# Patient Record
Sex: FEMALE | Race: BLACK OR AFRICAN AMERICAN | NOT HISPANIC OR LATINO | Employment: FULL TIME | URBAN - METROPOLITAN AREA
[De-identification: names, ages, dates, MRNs, and addresses within clinical notes are randomized per-mention and may not be internally consistent; named-entity substitution may affect disease eponyms.]

---

## 2017-04-17 ENCOUNTER — OFFICE VISIT (OUTPATIENT)
Dept: INTERNAL MEDICINE | Facility: CLINIC | Age: 32
End: 2017-04-17
Payer: COMMERCIAL

## 2017-04-17 ENCOUNTER — LAB VISIT (OUTPATIENT)
Dept: LAB | Facility: HOSPITAL | Age: 32
End: 2017-04-17
Attending: NURSE PRACTITIONER
Payer: COMMERCIAL

## 2017-04-17 VITALS
DIASTOLIC BLOOD PRESSURE: 66 MMHG | HEIGHT: 65 IN | BODY MASS INDEX: 39.63 KG/M2 | TEMPERATURE: 97 F | HEART RATE: 91 BPM | SYSTOLIC BLOOD PRESSURE: 106 MMHG | WEIGHT: 237.88 LBS

## 2017-04-17 DIAGNOSIS — Z11.3 SCREENING FOR STD (SEXUALLY TRANSMITTED DISEASE): ICD-10-CM

## 2017-04-17 DIAGNOSIS — Z00.00 ANNUAL PHYSICAL EXAM: Primary | ICD-10-CM

## 2017-04-17 DIAGNOSIS — F17.200 TOBACCO USE DISORDER: ICD-10-CM

## 2017-04-17 DIAGNOSIS — E66.9 OBESITY (BMI 35.0-39.9 WITHOUT COMORBIDITY): ICD-10-CM

## 2017-04-17 DIAGNOSIS — B97.7 HPV IN FEMALE: ICD-10-CM

## 2017-04-17 DIAGNOSIS — Z00.00 ANNUAL PHYSICAL EXAM: ICD-10-CM

## 2017-04-17 DIAGNOSIS — L50.9 URTICARIA: ICD-10-CM

## 2017-04-17 LAB
ALBUMIN SERPL BCP-MCNC: 3.2 G/DL
ALP SERPL-CCNC: 71 U/L
ALT SERPL W/O P-5'-P-CCNC: 21 U/L
ANION GAP SERPL CALC-SCNC: 9 MMOL/L
AST SERPL-CCNC: 18 U/L
BASOPHILS # BLD AUTO: 0.01 K/UL
BASOPHILS NFR BLD: 0.2 %
BILIRUB SERPL-MCNC: 0.3 MG/DL
BUN SERPL-MCNC: 11 MG/DL
CALCIUM SERPL-MCNC: 9.2 MG/DL
CHLORIDE SERPL-SCNC: 104 MMOL/L
CHOLEST/HDLC SERPL: 4 {RATIO}
CO2 SERPL-SCNC: 26 MMOL/L
CREAT SERPL-MCNC: 0.9 MG/DL
DIFFERENTIAL METHOD: ABNORMAL
EOSINOPHIL # BLD AUTO: 0.1 K/UL
EOSINOPHIL NFR BLD: 1 %
ERYTHROCYTE [DISTWIDTH] IN BLOOD BY AUTOMATED COUNT: 15.4 %
EST. GFR  (AFRICAN AMERICAN): >60 ML/MIN/1.73 M^2
EST. GFR  (NON AFRICAN AMERICAN): >60 ML/MIN/1.73 M^2
GLUCOSE SERPL-MCNC: 86 MG/DL
HCT VFR BLD AUTO: 36.8 %
HDL/CHOLESTEROL RATIO: 25.3 %
HDLC SERPL-MCNC: 198 MG/DL
HDLC SERPL-MCNC: 50 MG/DL
HGB BLD-MCNC: 12.2 G/DL
LDLC SERPL CALC-MCNC: 128.6 MG/DL
LYMPHOCYTES # BLD AUTO: 2.1 K/UL
LYMPHOCYTES NFR BLD: 36.5 %
MCH RBC QN AUTO: 28.6 PG
MCHC RBC AUTO-ENTMCNC: 33.2 %
MCV RBC AUTO: 86 FL
MONOCYTES # BLD AUTO: 0.2 K/UL
MONOCYTES NFR BLD: 3.1 %
NEUTROPHILS # BLD AUTO: 3.4 K/UL
NEUTROPHILS NFR BLD: 59 %
NONHDLC SERPL-MCNC: 148 MG/DL
PLATELET # BLD AUTO: 250 K/UL
PMV BLD AUTO: 11 FL
POTASSIUM SERPL-SCNC: 4 MMOL/L
PROT SERPL-MCNC: 7.4 G/DL
RBC # BLD AUTO: 4.27 M/UL
SODIUM SERPL-SCNC: 139 MMOL/L
TRIGL SERPL-MCNC: 97 MG/DL
WBC # BLD AUTO: 5.73 K/UL

## 2017-04-17 PROCEDURE — 85025 COMPLETE CBC W/AUTO DIFF WBC: CPT

## 2017-04-17 PROCEDURE — 99395 PREV VISIT EST AGE 18-39: CPT | Mod: S$GLB,,, | Performed by: NURSE PRACTITIONER

## 2017-04-17 PROCEDURE — 90715 TDAP VACCINE 7 YRS/> IM: CPT | Mod: S$GLB,,, | Performed by: NURSE PRACTITIONER

## 2017-04-17 PROCEDURE — 86592 SYPHILIS TEST NON-TREP QUAL: CPT

## 2017-04-17 PROCEDURE — 90471 IMMUNIZATION ADMIN: CPT | Mod: 59,S$GLB,, | Performed by: NURSE PRACTITIONER

## 2017-04-17 PROCEDURE — 86703 HIV-1/HIV-2 1 RESULT ANTBDY: CPT

## 2017-04-17 PROCEDURE — 80053 COMPREHEN METABOLIC PANEL: CPT

## 2017-04-17 PROCEDURE — 83036 HEMOGLOBIN GLYCOSYLATED A1C: CPT

## 2017-04-17 PROCEDURE — 99999 PR PBB SHADOW E&M-EST. PATIENT-LVL III: CPT | Mod: PBBFAC,,, | Performed by: NURSE PRACTITIONER

## 2017-04-17 PROCEDURE — 36415 COLL VENOUS BLD VENIPUNCTURE: CPT | Mod: PO

## 2017-04-17 PROCEDURE — 80074 ACUTE HEPATITIS PANEL: CPT

## 2017-04-17 PROCEDURE — 96372 THER/PROPH/DIAG INJ SC/IM: CPT | Mod: S$GLB,,, | Performed by: NURSE PRACTITIONER

## 2017-04-17 PROCEDURE — 80061 LIPID PANEL: CPT

## 2017-04-17 RX ORDER — BETAMETHASONE SODIUM PHOSPHATE AND BETAMETHASONE ACETATE 3; 3 MG/ML; MG/ML
6 INJECTION, SUSPENSION INTRA-ARTICULAR; INTRALESIONAL; INTRAMUSCULAR; SOFT TISSUE
Status: COMPLETED | OUTPATIENT
Start: 2017-04-17 | End: 2017-04-17

## 2017-04-17 RX ORDER — LORATADINE 10 MG/1
10 TABLET ORAL DAILY
Qty: 30 TABLET | Refills: 3 | Status: SHIPPED | OUTPATIENT
Start: 2017-04-17 | End: 2023-04-21

## 2017-04-17 RX ADMIN — BETAMETHASONE SODIUM PHOSPHATE AND BETAMETHASONE ACETATE 6 MG: 3; 3 INJECTION, SUSPENSION INTRA-ARTICULAR; INTRALESIONAL; INTRAMUSCULAR; SOFT TISSUE at 09:04

## 2017-04-17 NOTE — MR AVS SNAPSHOT
[facility]  [address] [facility] [place] [address]  Phone: [phone]  Fax: [phone]                  Allison Matamoros   2017 8:40 AM   Office Visit    Description:  Female : 1985   Provider:  Arlyn Park NP   Department:  [facility]           Reason for Visit     Annual Exam     Urticaria           Diagnoses this Visit        Comments    Annual physical exam    -  Primary     Tobacco use disorder         HPV in female         Localized hives     believed to be a stress related     Screening for STD (sexually transmitted disease)         Obesity (BMI 35.0-39.9 without comorbidity)         Urticaria                To Do List           Future Appointments        Provider Department Dept Phone    2017 9:30 AM LAB, SAME DAY [facility] [phone]    2017 10:10 AM SPECIMEN, [facility] [phone]      Goals (5 Years of Data)     None       These Medications        Disp Refills Start End    loratadine (CLARITIN) 10 mg tablet 30 tablet 3 2017    Take 1 tablet (10 mg total) by mouth once daily. - Oral    Pharmacy: [facility] [address] [place], [place] [address], [place] - [address] [place] [address] #: [phone]         [facility]     [facility] Nurse Care Line - 24 Assistance  Unless otherwise directed by your provider, please contact [facility], our nurse care line that is available for / assistance.     Registered nurses in the [facility] provide: appointment scheduling, clinical advisement, health education, and other advisory services.  Call: [phone] (toll free)               Medications           Message regarding Medications     Verify the changes and/or additions to your medication regime listed below are the same as discussed with your clinician today.  If any of these changes or additions are incorrect, please notify your healthcare provider.       "  START taking these NEW medications        Refills    loratadine (CLARITIN) 10 mg tablet 3    Sig: Take 1 tablet (10 mg total) by mouth once daily.    Class: Normal    Route: Oral      These medications were administered today        Dose Freq    betamethasone acetate-betamethasone sodium phosphate injection 6 mg 6 mg Clinic/HOD 1 time    Sig: Inject 1 mL (6 mg total) into the muscle one time.    Class: Normal    Route: Intramuscular           Verify that the below list of medications is an accurate representation of the medications you are currently taking.  If none reported, the list may be blank. If incorrect, please contact your healthcare provider. Carry this list with you in case of emergency.           Current Medications     loratadine (CLARITIN) 10 mg tablet Take 1 tablet (10 mg total) by mouth once daily.           Clinical Reference Information           Your Vitals Were     BP Pulse Temp Height Weight BMI    106/66 91 96.7 °F (35.9 °C) (Tympanic) 5' 5" (1.651 m) 107.9 kg (237 lb 14 oz) 39.58 kg/m2      Blood Pressure          Most Recent Value    BP  106/66      Allergies as of 4/17/2017     No Known Allergies      Immunizations Administered on Date of Encounter - 4/17/2017     Name Date Dose VIS Date Route    TDAP  Incomplete 0.5 mL 2/24/2015 Intramuscular      Orders Placed During Today's Visit      Normal Orders This Visit    Ambulatory consult to Obstetrics / Gynecology     Tdap Vaccine     Future Labs/Procedures Expected by Expires    CBC auto differential  4/17/2017 7/16/2017    Comprehensive metabolic panel  4/17/2017 7/16/2017    Hemoglobin A1c  4/17/2017 7/16/2017    Hepatitis panel, acute  4/17/2017 6/16/2018    HIV-1 and HIV-2 antibodies  4/17/2017 6/16/2018    Lipid panel  4/17/2017 7/16/2017    RPR  4/17/2017 6/16/2018    Urinalysis  4/17/2017 7/16/2017      Smoking Cessation     If you would like to quit smoking:   You may be eligible for free services if you are a [place] resident and " started smoking cigarettes before September 1, 1988.  Call the Smoking Cessation Trust (SCT) toll free at (960) 974-3724 or (005) 124-0256.   Call 1-800-QUIT-NOW if you do not meet the above criteria.   Contact us via email: [email or web address]   View our website for more information: [email or web address]        Language Assistance Services     ATTENTION: Language assistance services are available, free of charge. Please call [phone].      ATENCIÓN: Si habla español, tiene a chandler disposición servicios gratuitos de asistencia lingüística. Llame al [phone].     CHÚ Ý: N?u b?n nói Ti?ng Vi?t, có các d?ch v? h? tr? ngôn ng? mi?n phí dành cho b?n. G?i s? [phone].         [facility] complies with applicable Federal civil rights laws and does not discriminate on the basis of race, color, national origin, age, disability, or sex.

## 2017-04-17 NOTE — PROGRESS NOTES
Subjective:       Patient ID: Allison Matamoros is a 31 y.o. female.    Chief Complaint: Annual Exam and Urticaria    HPI Comments: Pt is here for annual exam    She has a hx of of obesity, tobacco use (1 pack every 1-2 weeks x 7 years), and HPV    Immunizations- will like to have tetanus vaccine today         Up to date with women's health- last pap normal- will have next one in 2018- she is interested in seeing OBGYN for BC discussion and to have annual completed.  No symptoms of STDs today. She is sexually active. She uses condoms.    Complaints - stress hives- she reports that at work ([employer]) x 4 years- stress is 10/10 while at work. States that she is always pressed for time and barely has time to eat. She took benadryl and she is still  itching and scratching although the hives have improved. She also took a oatmeal bath  She denies sob, cp, headaches, blurred vision, stomach upset, urinary complaints  She does not feel like she needs counseling or medication for stress. She thinks she has adequate coping mechanisms and a good support system at home.    Obesity- not consistent with exercising- is trying to do better. She has been attempting the atkins program- trying to avoid fast food. She has regular BMs.    Tobacco- has not tried to quit.       DM- runs in family would like to be checked.   No excessive hunger, but reports dry mouth and increased urination at night. No other urinary symptoms    Does not wear contacts or glasses  Does not have vision insurance    Dental- every 6 months for cleaning    Urticaria       Review of Systems    Objective:      Physical Exam    Assessment:       1. Annual physical exam    2. Tobacco use disorder    3. HPV in female    4. Screening for STD (sexually transmitted disease)    5. Obesity (BMI 35.0-39.9 without comorbidity)    6. Urticaria        Plan:   Annual physical exam  -     Lipid panel; Future; Expected date: 4/17/17  -     Tdap Vaccine  -     Ambulatory consult to  Obstetrics / Gynecology  -     Hemoglobin A1c; Future; Expected date: 4/17/17  -     Comprehensive metabolic panel; Future; Expected date: 4/17/17  -     CBC auto differential; Future; Expected date: 4/17/17    Tobacco use disorder  Comments:  pt is not interested in quitting at this time.     HPV in female  -     Ambulatory consult to Obstetrics / Gynecology    Screening for STD (sexually transmitted disease)  -     HIV-1 and HIV-2 antibodies; Future; Expected date: 4/17/17  -     Hepatitis panel, acute; Future; Expected date: 4/17/17  -     RPR; Future; Expected date: 4/17/17  -     Urinalysis; Future; Expected date: 4/17/17    Obesity (BMI 35.0-39.9 without comorbidity)  Comments:  pt attempting to start exercise routine and using Atkins diet.     Urticaria  Comments:  believed to be a stress related. start claritin daily. steroid IM today  Orders:  -     betamethasone acetate-betamethasone sodium phosphate injection 6 mg; Inject 1 mL (6 mg total) into the muscle one time.    Other orders  -     loratadine (CLARITIN) 10 mg tablet; Take 1 tablet (10 mg total) by mouth once daily.  Dispense: 30 tablet; Refill: 3      Avoid hot showers- could worsen urticaria. Continue oatmeal baths. Start claritin daily. Benadryl at night PRN  Tetanus today  Labs today   Std screening  Ob gyn referral for discussion of BC options and annual exam   RTC annually and PRN

## 2017-04-18 LAB
ESTIMATED AVG GLUCOSE: 103 MG/DL
HAV IGM SERPL QL IA: NEGATIVE
HBA1C MFR BLD HPLC: 5.2 %
HBV CORE IGM SERPL QL IA: NEGATIVE
HBV SURFACE AG SERPL QL IA: NEGATIVE
HCV AB SERPL QL IA: NEGATIVE
HIV 1+2 AB+HIV1 P24 AG SERPL QL IA: NEGATIVE
RPR SER QL: NORMAL

## 2021-03-04 ENCOUNTER — HISTORICAL (OUTPATIENT)
Dept: ADMINISTRATIVE | Facility: HOSPITAL | Age: 36
End: 2021-03-04

## 2021-03-04 LAB
ABS NEUT (OLG): 1.51 X10(3)/MCL (ref 2.1–9.2)
ALBUMIN SERPL-MCNC: 3.9 GM/DL (ref 3.5–5)
ALBUMIN/GLOB SERPL: 1.1 RATIO (ref 1.1–2)
ALP SERPL-CCNC: 55 UNIT/L (ref 40–150)
ALT SERPL-CCNC: 19 UNIT/L (ref 0–55)
APPEARANCE, UA: CLEAR
AST SERPL-CCNC: 18 UNIT/L (ref 5–34)
BACTERIA SPEC CULT: NORMAL /HPF
BASOPHILS # BLD AUTO: 0 X10(3)/MCL (ref 0–0.2)
BASOPHILS NFR BLD AUTO: 1 %
BILIRUB SERPL-MCNC: 0.3 MG/DL
BILIRUB UR QL STRIP: NEGATIVE
BILIRUBIN DIRECT+TOT PNL SERPL-MCNC: 0.1 MG/DL (ref 0–0.5)
BILIRUBIN DIRECT+TOT PNL SERPL-MCNC: 0.2 MG/DL (ref 0–0.8)
BUN SERPL-MCNC: 13.9 MG/DL (ref 7–18.7)
CALCIUM SERPL-MCNC: 9.2 MG/DL (ref 8.4–10.2)
CHLORIDE SERPL-SCNC: 104 MMOL/L (ref 98–107)
CHOLEST SERPL-MCNC: 217 MG/DL
CHOLEST/HDLC SERPL: 3 {RATIO} (ref 0–5)
CO2 SERPL-SCNC: 30 MMOL/L (ref 22–29)
COLOR UR: YELLOW
CREAT SERPL-MCNC: 0.8 MG/DL (ref 0.55–1.02)
EOSINOPHIL # BLD AUTO: 0.2 X10(3)/MCL (ref 0–0.9)
EOSINOPHIL NFR BLD AUTO: 4 %
ERYTHROCYTE [DISTWIDTH] IN BLOOD BY AUTOMATED COUNT: 13.7 % (ref 11.5–17)
EST. AVERAGE GLUCOSE BLD GHB EST-MCNC: 88.2 MG/DL
GLOBULIN SER-MCNC: 3.4 GM/DL (ref 2.4–3.5)
GLUCOSE (UA): NEGATIVE
GLUCOSE SERPL-MCNC: 86 MG/DL (ref 74–100)
HAV IGM SERPL QL IA: NONREACTIVE
HBA1C MFR BLD: 4.7 %
HBV CORE IGM SERPL QL IA: NONREACTIVE
HBV SURFACE AG SERPL QL IA: NONREACTIVE
HCT VFR BLD AUTO: 39.7 % (ref 37–47)
HCV AB SERPL QL IA: NONREACTIVE
HDLC SERPL-MCNC: 76 MG/DL (ref 35–60)
HEPATITIS PANEL INTERP: NORMAL
HGB BLD-MCNC: 12.6 GM/DL (ref 12–16)
HGB UR QL STRIP: NEGATIVE
HIV 1+2 AB+HIV1 P24 AG SERPL QL IA: NONREACTIVE
KETONES UR QL STRIP: NEGATIVE
LDLC SERPL CALC-MCNC: 130 MG/DL (ref 50–140)
LEUKOCYTE ESTERASE UR QL STRIP: NEGATIVE
LYMPHOCYTES # BLD AUTO: 2.3 X10(3)/MCL (ref 0.6–4.6)
LYMPHOCYTES NFR BLD AUTO: 53 %
MCH RBC QN AUTO: 32.1 PG (ref 27–31)
MCHC RBC AUTO-ENTMCNC: 31.7 GM/DL (ref 33–36)
MCV RBC AUTO: 101 FL (ref 80–94)
MONOCYTES # BLD AUTO: 0.3 X10(3)/MCL (ref 0.1–1.3)
MONOCYTES NFR BLD AUTO: 7 %
NEUTROPHILS # BLD AUTO: 1.51 X10(3)/MCL (ref 2.1–9.2)
NEUTROPHILS NFR BLD AUTO: 35 %
NITRITE UR QL STRIP: NEGATIVE
PH UR STRIP: 6 [PH] (ref 5–9)
PLATELET # BLD AUTO: 211 X10(3)/MCL (ref 130–400)
PMV BLD AUTO: 11.5 FL (ref 9.4–12.4)
POTASSIUM SERPL-SCNC: 4.2 MMOL/L (ref 3.5–5.1)
PROT SERPL-MCNC: 7.3 GM/DL (ref 6.4–8.3)
PROT UR QL STRIP: NEGATIVE
RBC # BLD AUTO: 3.93 X10(6)/MCL (ref 4.2–5.4)
RBC #/AREA URNS HPF: NORMAL /[HPF]
SODIUM SERPL-SCNC: 139 MMOL/L (ref 136–145)
SP GR UR STRIP: 1.02 (ref 1–1.03)
SQUAMOUS EPITHELIAL, UA: NORMAL
T PALLIDUM AB SER QL: NONREACTIVE
TRIGL SERPL-MCNC: 54 MG/DL (ref 37–140)
TSH SERPL-ACNC: 0.65 UIU/ML (ref 0.35–4.94)
UROBILINOGEN UR STRIP-ACNC: 0.2
VLDLC SERPL CALC-MCNC: 11 MG/DL
WBC # SPEC AUTO: 4.3 X10(3)/MCL (ref 4.5–11.5)
WBC #/AREA URNS HPF: NORMAL /[HPF]

## 2021-03-10 ENCOUNTER — HISTORICAL (OUTPATIENT)
Dept: ADMINISTRATIVE | Facility: HOSPITAL | Age: 36
End: 2021-03-10

## 2021-03-10 LAB
FOLATE SERPL-MCNC: 11.5 NG/ML (ref 7–31.4)
VIT B12 SERPL-MCNC: 671 PG/ML (ref 213–816)

## 2021-11-10 LAB
PAP RECOMMENDATION EXT: NORMAL
PAP SMEAR: NORMAL

## 2022-04-11 ENCOUNTER — HISTORICAL (OUTPATIENT)
Dept: ADMINISTRATIVE | Facility: HOSPITAL | Age: 37
End: 2022-04-11

## 2022-04-29 VITALS
BODY MASS INDEX: 30.34 KG/M2 | SYSTOLIC BLOOD PRESSURE: 112 MMHG | DIASTOLIC BLOOD PRESSURE: 70 MMHG | WEIGHT: 182.13 LBS | OXYGEN SATURATION: 98 % | HEIGHT: 65 IN

## 2022-12-10 ENCOUNTER — DOCUMENTATION ONLY (OUTPATIENT)
Dept: INTERNAL MEDICINE | Facility: CLINIC | Age: 37
End: 2022-12-10
Payer: COMMERCIAL

## 2023-04-18 DIAGNOSIS — Z00.00 ENCOUNTER FOR WELLNESS EXAMINATION IN ADULT: Primary | ICD-10-CM

## 2023-04-20 ENCOUNTER — LAB VISIT (OUTPATIENT)
Dept: LAB | Facility: HOSPITAL | Age: 38
End: 2023-04-20
Attending: FAMILY MEDICINE
Payer: COMMERCIAL

## 2023-04-20 DIAGNOSIS — Z00.00 ENCOUNTER FOR WELLNESS EXAMINATION IN ADULT: ICD-10-CM

## 2023-04-20 LAB
ALBUMIN SERPL-MCNC: 3.7 G/DL (ref 3.5–5)
ALBUMIN/GLOB SERPL: 1 RATIO (ref 1.1–2)
ALP SERPL-CCNC: 60 UNIT/L (ref 40–150)
ALT SERPL-CCNC: 14 UNIT/L (ref 0–55)
AST SERPL-CCNC: 18 UNIT/L (ref 5–34)
BASOPHILS # BLD AUTO: 0.03 X10(3)/MCL (ref 0–0.2)
BASOPHILS NFR BLD AUTO: 0.7 %
BILIRUBIN DIRECT+TOT PNL SERPL-MCNC: 0.7 MG/DL
BUN SERPL-MCNC: 12.5 MG/DL (ref 7–18.7)
CALCIUM SERPL-MCNC: 9.7 MG/DL (ref 8.4–10.2)
CHLORIDE SERPL-SCNC: 104 MMOL/L (ref 98–107)
CHOLEST SERPL-MCNC: 232 MG/DL
CHOLEST/HDLC SERPL: 3 {RATIO} (ref 0–5)
CO2 SERPL-SCNC: 23 MMOL/L (ref 22–29)
CREAT SERPL-MCNC: 0.87 MG/DL (ref 0.55–1.02)
DEPRECATED CALCIDIOL+CALCIFEROL SERPL-MC: 30.7 NG/ML (ref 30–80)
EOSINOPHIL # BLD AUTO: 0.14 X10(3)/MCL (ref 0–0.9)
EOSINOPHIL NFR BLD AUTO: 3.1 %
ERYTHROCYTE [DISTWIDTH] IN BLOOD BY AUTOMATED COUNT: 14.6 % (ref 11.5–17)
EST. AVERAGE GLUCOSE BLD GHB EST-MCNC: 88.2 MG/DL
GFR SERPLBLD CREATININE-BSD FMLA CKD-EPI: >60 MLS/MIN/1.73/M2
GLOBULIN SER-MCNC: 3.7 GM/DL (ref 2.4–3.5)
GLUCOSE SERPL-MCNC: 95 MG/DL (ref 74–100)
HBA1C MFR BLD: 4.7 %
HCT VFR BLD AUTO: 40.2 % (ref 37–47)
HDLC SERPL-MCNC: 74 MG/DL (ref 35–60)
HGB BLD-MCNC: 12.9 G/DL (ref 12–16)
IMM GRANULOCYTES # BLD AUTO: 0.01 X10(3)/MCL (ref 0–0.04)
IMM GRANULOCYTES NFR BLD AUTO: 0.2 %
LDLC SERPL CALC-MCNC: 148 MG/DL (ref 50–140)
LYMPHOCYTES # BLD AUTO: 1.57 X10(3)/MCL (ref 0.6–4.6)
LYMPHOCYTES NFR BLD AUTO: 34.9 %
MCH RBC QN AUTO: 29.9 PG (ref 27–31)
MCHC RBC AUTO-ENTMCNC: 32.1 G/DL (ref 33–36)
MCV RBC AUTO: 93.3 FL (ref 80–94)
MONOCYTES # BLD AUTO: 0.45 X10(3)/MCL (ref 0.1–1.3)
MONOCYTES NFR BLD AUTO: 10 %
NEUTROPHILS # BLD AUTO: 2.3 X10(3)/MCL (ref 2.1–9.2)
NEUTROPHILS NFR BLD AUTO: 51.1 %
NRBC BLD AUTO-RTO: 0 %
PLATELET # BLD AUTO: 235 X10(3)/MCL (ref 130–400)
PMV BLD AUTO: 10.2 FL (ref 7.4–10.4)
POTASSIUM SERPL-SCNC: 4.2 MMOL/L (ref 3.5–5.1)
PROT SERPL-MCNC: 7.4 GM/DL (ref 6.4–8.3)
RBC # BLD AUTO: 4.31 X10(6)/MCL (ref 4.2–5.4)
SODIUM SERPL-SCNC: 140 MMOL/L (ref 136–145)
TRIGL SERPL-MCNC: 51 MG/DL (ref 37–140)
TSH SERPL-ACNC: 1.46 UIU/ML (ref 0.35–4.94)
VLDLC SERPL CALC-MCNC: 10 MG/DL
WBC # SPEC AUTO: 4.5 X10(3)/MCL (ref 4.5–11.5)

## 2023-04-20 PROCEDURE — 83036 HEMOGLOBIN GLYCOSYLATED A1C: CPT

## 2023-04-20 PROCEDURE — 80061 LIPID PANEL: CPT

## 2023-04-20 PROCEDURE — 80053 COMPREHEN METABOLIC PANEL: CPT

## 2023-04-20 PROCEDURE — 84443 ASSAY THYROID STIM HORMONE: CPT

## 2023-04-20 PROCEDURE — 85025 COMPLETE CBC W/AUTO DIFF WBC: CPT

## 2023-04-20 PROCEDURE — 36415 COLL VENOUS BLD VENIPUNCTURE: CPT

## 2023-04-20 PROCEDURE — 82306 VITAMIN D 25 HYDROXY: CPT

## 2023-04-21 ENCOUNTER — PATIENT MESSAGE (OUTPATIENT)
Dept: FAMILY MEDICINE | Facility: CLINIC | Age: 38
End: 2023-04-21

## 2023-04-21 ENCOUNTER — HOSPITAL ENCOUNTER (OUTPATIENT)
Dept: RADIOLOGY | Facility: HOSPITAL | Age: 38
Discharge: HOME | End: 2023-04-21
Attending: FAMILY MEDICINE
Payer: COMMERCIAL

## 2023-04-21 ENCOUNTER — OFFICE VISIT (OUTPATIENT)
Dept: FAMILY MEDICINE | Facility: CLINIC | Age: 38
End: 2023-04-21
Payer: COMMERCIAL

## 2023-04-21 VITALS
WEIGHT: 233.13 LBS | OXYGEN SATURATION: 99 % | HEIGHT: 65 IN | RESPIRATION RATE: 18 BRPM | SYSTOLIC BLOOD PRESSURE: 112 MMHG | DIASTOLIC BLOOD PRESSURE: 72 MMHG | HEART RATE: 72 BPM | BODY MASS INDEX: 38.84 KG/M2

## 2023-04-21 DIAGNOSIS — G89.29 CHRONIC PAIN OF LEFT KNEE: ICD-10-CM

## 2023-04-21 DIAGNOSIS — R53.83 FATIGUE, UNSPECIFIED TYPE: ICD-10-CM

## 2023-04-21 DIAGNOSIS — L65.9 HAIR LOSS: ICD-10-CM

## 2023-04-21 DIAGNOSIS — M25.562 CHRONIC PAIN OF LEFT KNEE: ICD-10-CM

## 2023-04-21 DIAGNOSIS — Z00.00 WELLNESS EXAMINATION: Primary | ICD-10-CM

## 2023-04-21 DIAGNOSIS — E66.09 CLASS 2 OBESITY DUE TO EXCESS CALORIES WITHOUT SERIOUS COMORBIDITY WITH BODY MASS INDEX (BMI) OF 38.0 TO 38.9 IN ADULT: ICD-10-CM

## 2023-04-21 DIAGNOSIS — M25.562 CHRONIC PAIN OF LEFT KNEE: Primary | ICD-10-CM

## 2023-04-21 DIAGNOSIS — G89.29 CHRONIC PAIN OF LEFT KNEE: Primary | ICD-10-CM

## 2023-04-21 LAB
APPEARANCE UR: CLEAR
BACTERIA #/AREA URNS AUTO: NORMAL /HPF
BILIRUB UR QL STRIP.AUTO: NEGATIVE MG/DL
COLOR UR AUTO: YELLOW
GLUCOSE UR QL STRIP.AUTO: NEGATIVE MG/DL
KETONES UR QL STRIP.AUTO: NEGATIVE MG/DL
LEUKOCYTE ESTERASE UR QL STRIP.AUTO: NEGATIVE UNIT/L
NITRITE UR QL STRIP.AUTO: NEGATIVE
PH UR STRIP.AUTO: 6.5 [PH]
PROT UR QL STRIP.AUTO: NEGATIVE MG/DL
RBC #/AREA URNS AUTO: <5 /HPF
RBC UR QL AUTO: NEGATIVE UNIT/L
SP GR UR STRIP.AUTO: 1.01 (ref 1–1.03)
SQUAMOUS #/AREA URNS AUTO: <5 /HPF
UROBILINOGEN UR STRIP-ACNC: 0.2 MG/DL
WBC #/AREA URNS AUTO: <5 /HPF

## 2023-04-21 PROCEDURE — 3008F BODY MASS INDEX DOCD: CPT | Mod: CPTII,,, | Performed by: FAMILY MEDICINE

## 2023-04-21 PROCEDURE — 99214 PR OFFICE/OUTPT VISIT, EST, LEVL IV, 30-39 MIN: ICD-10-PCS | Mod: 25,,, | Performed by: FAMILY MEDICINE

## 2023-04-21 PROCEDURE — 3008F PR BODY MASS INDEX (BMI) DOCUMENTED: ICD-10-PCS | Mod: CPTII,,, | Performed by: FAMILY MEDICINE

## 2023-04-21 PROCEDURE — 1160F RVW MEDS BY RX/DR IN RCRD: CPT | Mod: CPTII,,, | Performed by: FAMILY MEDICINE

## 2023-04-21 PROCEDURE — 1159F MED LIST DOCD IN RCRD: CPT | Mod: CPTII,,, | Performed by: FAMILY MEDICINE

## 2023-04-21 PROCEDURE — 1159F PR MEDICATION LIST DOCUMENTED IN MEDICAL RECORD: ICD-10-PCS | Mod: CPTII,,, | Performed by: FAMILY MEDICINE

## 2023-04-21 PROCEDURE — 3078F DIAST BP <80 MM HG: CPT | Mod: CPTII,,, | Performed by: FAMILY MEDICINE

## 2023-04-21 PROCEDURE — 73560 X-RAY EXAM OF KNEE 1 OR 2: CPT | Mod: TC,LT

## 2023-04-21 PROCEDURE — 3074F SYST BP LT 130 MM HG: CPT | Mod: CPTII,,, | Performed by: FAMILY MEDICINE

## 2023-04-21 PROCEDURE — 3074F PR MOST RECENT SYSTOLIC BLOOD PRESSURE < 130 MM HG: ICD-10-PCS | Mod: CPTII,,, | Performed by: FAMILY MEDICINE

## 2023-04-21 PROCEDURE — 99214 OFFICE O/P EST MOD 30 MIN: CPT | Mod: 25,,, | Performed by: FAMILY MEDICINE

## 2023-04-21 PROCEDURE — 3078F PR MOST RECENT DIASTOLIC BLOOD PRESSURE < 80 MM HG: ICD-10-PCS | Mod: CPTII,,, | Performed by: FAMILY MEDICINE

## 2023-04-21 PROCEDURE — 1160F PR REVIEW ALL MEDS BY PRESCRIBER/CLIN PHARMACIST DOCUMENTED: ICD-10-PCS | Mod: CPTII,,, | Performed by: FAMILY MEDICINE

## 2023-04-21 PROCEDURE — 99395 PR PREVENTIVE VISIT,EST,18-39: ICD-10-PCS | Mod: ,,, | Performed by: FAMILY MEDICINE

## 2023-04-21 PROCEDURE — 99395 PREV VISIT EST AGE 18-39: CPT | Mod: ,,, | Performed by: FAMILY MEDICINE

## 2023-04-21 RX ORDER — DICLOFENAC SODIUM 10 MG/G
2 GEL TOPICAL 4 TIMES DAILY PRN
Qty: 100 G | Refills: 1 | Status: SHIPPED | OUTPATIENT
Start: 2023-04-21 | End: 2024-04-02 | Stop reason: SDUPTHER

## 2023-04-21 RX ORDER — MINOXIDIL 2 %
SOLUTION, NON-ORAL TOPICAL 2 TIMES DAILY
Qty: 60 ML | Refills: 1 | Status: SHIPPED | OUTPATIENT
Start: 2023-04-21

## 2023-04-21 NOTE — PROGRESS NOTES
Patient ID: [id number]     Chief Complaint: Annual Exam        HPI:     Allison Lopez is a 37 y.o. female here today for annual wellness exam.  Well Adult History   The patient presents for well adult exam. The patient's general health status is described as good. The patient's diet is described as balanced. Exercise: occasional. Associated symptoms consist of reports 60 pound intentional weight loss with lifestyle modifications, denies weight gain, denies fatigue, denies headache, denies snoring, denies witnessed apnea, denies hearing loss and denies vision changes. Last menstrual period: 04/02/2023, regular. Additional pertinent history: last dental exam: > 6 months ago, she has dental insurance, she will schedule an appt. next available, last eye exam: >2 years ago (wears reading eyeglasses, she has vision insurance, she will schedule an appt. next available), last pap smear : 11/10/2021 (WNL with negative HPV with GYN- Dr. Lanier), seat belt use, daily caffeine use (coffee), tobacco use none- quit smoking cigarettes in 2021, social alcohol use and She had labs done on 04/20/2023, here to discuss the results today. She would not like STD screening. She refuses flu vaccine and COVID-19 booster, she is UTD on all other vaccines. She is obese, gained 37 pounds since last visit due to non-compliance with diet, she is not interested in surgical weight loss, she would like to see a dietician, she would like trial of Wegovy to help with weight loss. She denies family history of MEN II or medullary thyroid cancer or personal history of pancreatitis, she is trying to get pregnant.  - She complains of fatigue, hair loss, occasional left knee pain/swelling x several months. She remote history of a fall on knee, pain is 1-2/10 on pain scale, she reports that it's worse with walking, she hasn't had imaging or PT, she denies popping of knee. She has tried OTC Tylenol without resolution of symptoms. She reports that she used  to relax her hair, but stopped due to her hair loss. She denies family history of auto-immune illness.   - Patient is without any other complaints today.    Advance Care Planning     Date: 2023  Patient did not wish or was not able to name a surrogate decision maker or provide an Advance Care Plan.          ----------------------------  Abnormal Pap smear      Comment:  Colposcopy and cold cone  History of gonorrhea  Tobacco use disorder     Past Surgical History:   Procedure Laterality Date    APPENDECTOMY       SECTION, LOW TRANSVERSE      x 1       Review of patient's allergies indicates:  No Known Allergies    No outpatient medications have been marked as taking for the 23 encounter (Office Visit) with Yaquelin Bailey MD.       Social History     Socioeconomic History    Marital status: Single    Number of children: 1   Occupational History    Occupation: IT     Employer: [employer]     Comment: [employer]   Tobacco Use    Smoking status: Former     Packs/day: 0.20     Years: 1.00     Pack years: 0.20     Types: Cigarettes    Smokeless tobacco: Never   Substance and Sexual Activity    Alcohol use: Yes     Alcohol/week: 2.5 standard drinks     Types: 3 drink(s) per week     Comment: Occasionally    Drug use: No    Sexual activity: Yes     Partners: Male     Birth control/protection: Condom     Comment: 1 partner     Social Determinants of Health     Financial Resource Strain: Low Risk     Difficulty of Paying Living Expenses: Not very hard   Food Insecurity: No Food Insecurity    Worried About Running Out of Food in the Last Year: Never true    Ran Out of Food in the Last Year: Never true   Transportation Needs: No Transportation Needs    Lack of Transportation (Medical): No    Lack of Transportation (Non-Medical): No   Physical Activity: Insufficiently Active    Days of Exercise per Week: 3 days    Minutes of Exercise per Session: 40 min   Stress: No Stress Concern Present    Feeling of Stress : Not  at all   Social Connections: Socially Isolated    Frequency of Communication with Friends and Family: Once a week    Frequency of Social Gatherings with Friends and Family: Once a week    Attends Buddhism Services: Never    Active Member of Clubs or Organizations: No    Attends Club or Organization Meetings: Never    Marital Status: Never    Housing Stability: Low Risk     Unable to Pay for Housing in the Last Year: No    Number of Places Lived in the Last Year: 1    Unstable Housing in the Last Year: No        Family History   Problem Relation Age of Onset    No Known Problems Mother     Diabetes Sister     No Known Problems Daughter     Hypertension Maternal Grandmother     Diabetes Maternal Grandfather     Stroke Neg Hx     Cancer Neg Hx     Heart disease Neg Hx         Subjective:       Review of Systems:    See HPI for details    Constitutional: No fever, No chills, Reports fatigue.   Eye: No blurring, No visual disturbances.   Ear/Nose/Mouth/Throat: No decreased hearing, No ear pain, No nasal congestion, No sore throat.   Respiratory: No shortness of breath, No cough, No wheezing.   Cardiovascular: No chest pain, No palpitations, No peripheral edema.   Breast: Both breasts, No lump/ mass, No pain.   Nipple discharge: None.   Gastrointestinal: No nausea, No vomiting, No diarrhea, No constipation, No abdominal pain.   Genitourinary: No dysuria, No hematuria.   Gynecologic: Negative except as documented in history of present illness.   Hematology/Lymphatics: No bruising tendency, No bleeding tendency, No swollen lymph glands.   Endocrine: No excessive thirst, No polyuria, No excessive hunger.   Musculoskeletal: Reports joint pain, No muscle pain, No decreased range of motion.   Integumentary: As per HPI. No rash, No pruritus.   Neurologic: No abnormal balance, No confusion, No headache.   Psychiatric: No anxiety, No depression, Not suicidal, No hallucinations.     All Other ROS: Negative except as stated  "in HPI.   Answers submitted by the patient for this visit:  Review of Systems Questionnaire (Submitted on 4/18/2023)  activity change: Yes  unexpected weight change: Yes  neck pain: No  hearing loss: No  rhinorrhea: No  trouble swallowing: No  eye discharge: No  visual disturbance: No  chest tightness: No  wheezing: No  chest pain: No  palpitations: Yes  blood in stool: No  constipation: No  vomiting: No  diarrhea: No  polydipsia: No  polyuria: Yes  difficulty urinating: No  hematuria: No  menstrual problem: No  dysuria: No  joint swelling: Yes  arthralgias: Yes  headaches: Yes  weakness: Yes  confusion: No  dysphoric mood: No      Objective:     /72 (BP Location: Left arm, Patient Position: Sitting, BP Method: Large (Automatic))   Pulse 72   Resp 18   Ht 5' 5" (1.651 m)   Wt 105.7 kg (233 lb 1.6 oz)   LMP 04/02/2023   SpO2 99%   BMI 38.79 kg/m²     Physical Exam    General: Alert and oriented, No acute distress, Obese.   Eye: Pupils are equal, round and reactive to light, Extraocular movements are intact, Normal conjunctiva.   HENT: Normocephalic, Tympanic membranes are clear, Normal hearing, Oral mucosa is moist, No pharyngeal erythema.   Throat: Pharynx ( Not edematous, No exudate ).   Neck: Supple, Non-tender, No carotid bruit, No lymphadenopathy, No thyromegaly.   Respiratory: Lungs are clear to auscultation, Respirations are non-labored, Breath sounds are equal, Symmetrical chest wall expansion, No chest wall tenderness.   Cardiovascular: Normal rate, Regular rhythm, No murmur, Good pulses equal in all extremities, No edema.   Gastrointestinal: Soft, Non-tender, Non-distended, Normal bowel sounds, No organomegaly.   Genitourinary: No costovertebral angle tenderness.   Musculoskeletal: Normal range of motion, No tenderness, Normal gait. Left knee with FROM, NT, positive crepitus, no erythema, no warmth, negative Lachman's, negative Amy's.  Integumentary: Mid scalp with thinning hair, still has " hair growth from follicles, no erythema, no rashes.  Neurologic: No focal deficits, Cranial Nerves II-XII are grossly intact.   Psychiatric: Cooperative, Appropriate mood & affect, Normal judgment, Non-suicidal.   Mood and affect: Calm.   Behavior: Relaxed.     *Lab results from 04/20/2023 were reviewed and discussed with patient and patient voices understanding. *        Assessment:       ICD-10-CM ICD-9-CM   1. Wellness examination  Z00.00 V70.0   2. Class 2 obesity due to excess calories without serious comorbidity with body mass index (BMI) of 38.0 to 38.9 in adult  E66.09 278.00    Z68.38 V85.38   3. Fatigue, unspecified type  R53.83 780.79   4. Hair loss  L65.9 704.00   5. Chronic pain of left knee  M25.562 719.46    G89.29 338.29        Plan:     Problem List Items Addressed This Visit          Endocrine    Obesity    Relevant Medications    semaglutide, weight loss, 0.25 mg/0.5 mL PnIj    Other Relevant Orders    Ambulatory referral/consult to Nutrition Services     Other Visit Diagnoses       Wellness examination    -  Primary    Relevant Orders    Urinalysis, Reflex to Urine Culture    Fatigue, unspecified type        Relevant Orders    C-Reactive Protein    ANTINUCLEAR ANTIBODIES COMPREHENSIVE PANEL    CYCLIC CITRULLINATED PEPTIDE (CCP) ANTIBODY    Rheumatoid Factors, IgA, IgG, IgM    Uric Acid    Vitamin B12    CK    Hair loss        Relevant Medications    minoxidiL (ROGAINE) 2 % external solution    Other Relevant Orders    ANTINUCLEAR ANTIBODIES COMPREHENSIVE PANEL    CYCLIC CITRULLINATED PEPTIDE (CCP) ANTIBODY    Rheumatoid Factors, IgA, IgG, IgM    Uric Acid    Vitamin B12    CK    Chronic pain of left knee        Relevant Medications    diclofenac sodium (VOLTAREN) 1 % Gel    Other Relevant Orders    C-Reactive Protein    ANTINUCLEAR ANTIBODIES COMPREHENSIVE PANEL    CYCLIC CITRULLINATED PEPTIDE (CCP) ANTIBODY    Rheumatoid Factors, IgA, IgG, IgM    Uric Acid    CK    X-Ray Knee 1 or 2 View Left          1. Wellness examination  - Urinalysis, Reflex to Urine Culture; Future  - Will treat pending lab results. Monthly breast self exam encouraged. Diet, exercise, and 10% weight loss encouraged. Keep appointment for dental exams x q6 months as scheduled. Keep appointment for annual eye exam as scheduled. Keep appointment with GYN for annual pap smear as scheduled. Notify M.D. or ER if temp greater than 100.4, or any acute illness.      2. Class 2 obesity due to excess calories without serious comorbidity with body mass index (BMI) of 38.0 to 38.9 in adult  - Ambulatory referral/consult to Nutrition Services; Future for dietary counseling  - semaglutide, weight loss, 0.25 mg/0.5 mL PnIj; Inject 0.25 mg into the skin every 7 days.  Dispense: 2 mL; Refill: 1  - Diet. exercise, and 10% weight loss encouraged. Rx trial of Wegovy with close monitoring to help with obesity. She agrees to avoid pregnancy with Wegovy. Patient agrees to download Wegovy coupon card from the internet. Will titrate Rx Wegovy as needed/tolerated until max dose achieved. Notify M.D. or ER if symptoms persist or worsen, adverse Rx side effects, temp greater than 100.4, or any acute illness.      3. Fatigue, unspecified type  - C-Reactive Protein; Future  - ANTINUCLEAR ANTIBODIES COMPREHENSIVE PANEL; Future  - CYCLIC CITRULLINATED PEPTIDE (CCP) ANTIBODY; Future  - Rheumatoid Factors, IgA, IgG, IgM; Future  - Uric Acid; Future  - Vitamin B12; Future  - CK; Future  - Daily MVI encouraged. Will treat pending results. Notify M.D. or ER if symptoms persist or worsen, temp >100.4, or any acute illness.      4. Hair loss  - Rx trial of minoxidiL (ROGAINE) 2 % external solution; Apply topically 2 (two) times daily.  Dispense: 60 mL; Refill: 1  - ANTINUCLEAR ANTIBODIES COMPREHENSIVE PANEL; Future  - CYCLIC CITRULLINATED PEPTIDE (CCP) ANTIBODY; Future  - Rheumatoid Factors, IgA, IgG, IgM; Future  - Uric Acid; Future  - Vitamin B12; Future  - CK; Future  -  Rx trial of OTC Biotin as directed. Will treat pending results. Avoid chemicals in hair. If no improvement, will proceed with Dermatology referral. Notify M.D. or ER if symptoms persist or worsen, temp >100.4, or any acute illness.      5. Chronic pain of left knee  - C-Reactive Protein; Future  - ANTINUCLEAR ANTIBODIES COMPREHENSIVE PANEL; Future  - CYCLIC CITRULLINATED PEPTIDE (CCP) ANTIBODY; Future  - Rheumatoid Factors, IgA, IgG, IgM; Future  - Uric Acid; Future  - Rx trial of diclofenac sodium (VOLTAREN) 1 % Gel; Apply 2 g topically 4 (four) times daily as needed (pain/inflammation).  Dispense: 100 g; Refill: 1  - CK; Future  - X-Ray Knee 1 or 2 View Left; Future  - Stretching exercises encouraged. Will treat pending results. If XR is normal, will proceed with PT referral. Notify M.D. or ER if symptoms persist or worsen, temp >100.4, or any acute illness.          Allison was seen today for annual exam.    Diagnoses and all orders for this visit:    Wellness examination  -     Urinalysis, Reflex to Urine Culture; Future    Class 2 obesity due to excess calories without serious comorbidity with body mass index (BMI) of 38.0 to 38.9 in adult  -     Ambulatory referral/consult to Nutrition Services; Future  -     semaglutide, weight loss, 0.25 mg/0.5 mL PnIj; Inject 0.25 mg into the skin every 7 days.    Fatigue, unspecified type  -     C-Reactive Protein; Future  -     ANTINUCLEAR ANTIBODIES COMPREHENSIVE PANEL; Future  -     CYCLIC CITRULLINATED PEPTIDE (CCP) ANTIBODY; Future  -     Rheumatoid Factors, IgA, IgG, IgM; Future  -     Uric Acid; Future  -     Vitamin B12; Future  -     CK; Future    Hair loss  -     minoxidiL (ROGAINE) 2 % external solution; Apply topically 2 (two) times daily.  -     ANTINUCLEAR ANTIBODIES COMPREHENSIVE PANEL; Future  -     CYCLIC CITRULLINATED PEPTIDE (CCP) ANTIBODY; Future  -     Rheumatoid Factors, IgA, IgG, IgM; Future  -     Uric Acid; Future  -     Vitamin B12; Future  -      CK; Future    Chronic pain of left knee  -     C-Reactive Protein; Future  -     ANTINUCLEAR ANTIBODIES COMPREHENSIVE PANEL; Future  -     CYCLIC CITRULLINATED PEPTIDE (CCP) ANTIBODY; Future  -     Rheumatoid Factors, IgA, IgG, IgM; Future  -     Uric Acid; Future  -     diclofenac sodium (VOLTAREN) 1 % Gel; Apply 2 g topically 4 (four) times daily as needed (pain/inflammation).  -     CK; Future  -     X-Ray Knee 1 or 2 View Left; Future    Other orders  -     Cancel: X-Ray Knee 1 or 2 View Right; Future          Medication List with Changes/Refills   New Medications    DICLOFENAC SODIUM (VOLTAREN) 1 % GEL    Apply 2 g topically 4 (four) times daily as needed (pain/inflammation).       Start Date: 4/21/2023 End Date: --    MINOXIDIL (ROGAINE) 2 % EXTERNAL SOLUTION    Apply topically 2 (two) times daily.       Start Date: 4/21/2023 End Date: --    SEMAGLUTIDE, WEIGHT LOSS, 0.25 MG/0.5 ML PNIJ    Inject 0.25 mg into the skin every 7 days.       Start Date: 4/21/2023 End Date: 6/20/2023   Current Medications    LORATADINE (CLARITIN) 10 MG TABLET    Take 1 tablet (10 mg total) by mouth once daily.       Start Date: 4/17/2017 End Date: 4/17/2018          Follow up in about 4 weeks (around 5/19/2023) for Obesity Followup, Virtual Visit.

## 2023-04-24 ENCOUNTER — PATIENT MESSAGE (OUTPATIENT)
Dept: FAMILY MEDICINE | Facility: CLINIC | Age: 38
End: 2023-04-24
Payer: COMMERCIAL

## 2023-04-25 ENCOUNTER — PATIENT MESSAGE (OUTPATIENT)
Dept: FAMILY MEDICINE | Facility: CLINIC | Age: 38
End: 2023-04-25
Payer: COMMERCIAL

## 2023-04-25 DIAGNOSIS — R76.8 RHEUMATOID FACTOR POSITIVE: Primary | ICD-10-CM

## 2023-04-26 ENCOUNTER — TELEPHONE (OUTPATIENT)
Dept: FAMILY MEDICINE | Facility: CLINIC | Age: 38
End: 2023-04-26
Payer: COMMERCIAL

## 2023-05-18 ENCOUNTER — NUTRITION (OUTPATIENT)
Dept: NUTRITION | Facility: HOSPITAL | Age: 38
End: 2023-05-18
Attending: FAMILY MEDICINE
Payer: COMMERCIAL

## 2023-05-18 DIAGNOSIS — E66.09 CLASS 2 OBESITY DUE TO EXCESS CALORIES WITHOUT SERIOUS COMORBIDITY WITH BODY MASS INDEX (BMI) OF 38.0 TO 38.9 IN ADULT: ICD-10-CM

## 2023-05-18 PROCEDURE — 97802 MEDICAL NUTRITION INDIV IN: CPT

## 2023-05-18 NOTE — PROGRESS NOTES
Nutrition Note: 2023   Referring Provider: Yaquelin Bailey, *  Reason for visit: Obesity/Weight Management   Consultation Time: 60 Minutes     A = NUTRITION ASSESSMENT   Patient Information:    Allison Lopez  : 1985   37 y.o. female    Allergies/Intolerances:  none reports intolerance to avocados    Anthropometrics:     Wt:   106.6 kg                               Ht   165.1 cm       BMI: 39  IBW: 56.7 kg (188% IBW)           Supplements/Vitamins:    MVI/Supp: Reviewed  Drug/Nutrient interactions: Reviewed and yes  Activity Level:     Sedentary      Form of Activity: None at this time      Food/Nutrition-related hx:    Diet/PO Recall:   Appetite: Good  Fluid Intake: Inadequate  Diet Recall:  Breakfast: grits, eggs, mccarty or omelet or oatmeal  Lunch: rice and gravy, spaghetti, leftovers, fast food - raising cane occasionally  Dinner: Similar to lunch   Snacks: 0-1x/day popcorn, chips, or candy after dinner   Drinks: Sodas x 2 16 oz, very little water, zero calorie fruit juice, alcohol (1 - 2 drinks x day)     Eating out: 0-1x/week.     Patient Notes/Reports: Pt referred for wt management by Dr. Bailey. Pt with hx T2DM in family. Pt very pleasant. Past diet hx includes keto diet. Pt tells me lost a lot of wt on this, but not sustainable, and gained all wt back once quit. Is looking for a more balanced approach to dieting. Would also like to set good example for her dtr. Per diet recall, pt eating high fat/high Na food items, and drinking sugary and/or alcoholic beverages. Tells me tends to over-eat in evening. Used to exercise but has stopped. 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    Medical Tests and Procedures:  Patient Active Problem List   Diagnosis    Tobacco use disorder    Obesity    HPV in female      Past Medical History:   Diagnosis Date    Abnormal Pap smear 2011    Colposcopy and cold cone    History of gonorrhea     Tobacco use disorder      Past Surgical  History:   Procedure Laterality Date    APPENDECTOMY       SECTION, LOW TRANSVERSE      x 1       Current Outpatient Medications   Medication Instructions    diclofenac sodium (VOLTAREN) 2 g, Topical (Top), 4 times daily PRN    minoxidiL (ROGAINE) 2 % external solution Topical (Top), 2 times daily    semaglutide (weight loss) 0.25 mg, Subcutaneous, Every 7 days      Labs:    Lab Results   Component Value Date    WBC 4.5 2023    HGB 12.9 2023    HCT 40.2 2023    CHOL 232 (H) 2023    TRIG 51 2023    HDL 74 (H) 2023    LDLCALC 128.6 2017    HGBA1C 4.7 2023     2023    K 4.2 2023    CALCIUM 9.7 2023         D = NUTRITION DIAGNOSIS    PES Statement:   Primary Problem: Overweight/Obesity  related to physical inactivity/intake in excess of needs as evidenced by BMI of 39, diet recall, pt reports.      Secondary Problem: Undesirable Food Choices  related to lack of prior exposure to nutrition education by RD as evidenced by diet recall.      I = NUTRITION INTERVENTION   Discussed healthy plate method on healthy eating, incorporating more non-starchy vegetables, whole grains, and eliminating high calorie/sugary beverages. Discussed sugary foods and/or beverages (potential health consequences of excessive sugar intake, ways to reduce sugar intake, healthy beverage alternatives, etc.). Discussed saturated fat and trans fat intake and examples of high fat foods to avoid (potential health consequences and recommendation to choose more heart healthy fats) Discussed nutrient-dense meals and snacks versus empty-calories.  Discussed importance of physical activity - pt agreeable to walk/strength train at least 30 min/day x 5 days/wk. Discussed taking a walk break every hour when working at desk. Discussed importance of small behavior/habit changes in improving long-term adherence and sustainability (pt agreeable to avoid sodas, limit alcohol, exercise,  "and incorporate more vegetables into meals. Pt was able to make mock meal plan with little RDN assistance incorporating healthy choices at meals, and improving from diet recall.     Answered parents/patient's questions appropriately.      Patient  active and engaged during session and verbalized desire to make changes. Contact information provided, understanding verbalized and compliance expected.   Estimated Energy/Fluid Requirements:   Weight used: .6 kg  Calories: 1200 - 1500 kcal/day (11-14 kcal/kg )  Protein: 113 g/day (2.0 g/kg/IBW)  Fluid: 2340 mL/day or 78 oz/day (22 mL/kg) or per MD.    Education Materials Provided:   Nutrition Plan, Healthy Plate Method, and [employer] grocery Guide, [employer] restaurant Guide, 5 day Meal Plan    Recommendations:     Ensure balanced eating pattern of 3 meals, 1-2 snacks daily. Avoid skipped meals.    Focus on protein at all meals and snacks. Examples provided.    Gradually increase exercise to 150 min/wk or 30 min x 5 days/wk.  - Plans to walk/strength train  Keep portions appropriate by measuring and weighing all foods or using body (fist, palm, etc)   Incorporate cross-training exercises for example strength (own body weight, pushups, resistance training/weight bearing), aerobic/cardiac (walking/running, jumping jacks, etc), and flexibility (stretching, yoga.    Ensure adequate H2O intake of 8 - 10 cups/day   Make grocery list ahead of time to plan healthy meals   Fill 9 in plate with 1/2 non-starchy vegetables, 1/4 protein, 1/4 CHO using My[employer] Handout     Be mindful of hunger and satiety cures using hunger-satiety scale as guideline. Pause in the middle of eating and ask yourself how the food tastes, and what your current hunger level is.    Keep diary to record daily goals. Check off in evening as completed  Avoid sugary beverages/alcohol/ or other "empty calories" - examples provided        M/E = NUTRITION MONITORING AND EVALUATION   Goal 1: Adhere to " "dietary/lifestyle guidelines to facilitate wt loss of 10% (~24 lbs) in next 3 - 4 months  Indicator: Weight/BMI    Goal 2: Diet recall shows decrease/improvements in high calorie foods/drinks and consuming balanced eating pattern including lean proteins, whole grains, and fruit/vegetables by next RD visit.   Indicator: Diet Recall     F/U:  Family/Patient provided with Dietitian contact number and advised to call or make future appointment if further intervention is needed.    Communication with provider via Epic    Signature: Jaye Yo (Brooke) RDN, LDN      "

## 2023-07-31 ENCOUNTER — PATIENT MESSAGE (OUTPATIENT)
Dept: RESEARCH | Facility: HOSPITAL | Age: 38
End: 2023-07-31
Payer: COMMERCIAL

## 2023-09-12 ENCOUNTER — OFFICE VISIT (OUTPATIENT)
Dept: RHEUMATOLOGY | Facility: CLINIC | Age: 38
End: 2023-09-12
Payer: COMMERCIAL

## 2023-09-12 VITALS
WEIGHT: 241.19 LBS | SYSTOLIC BLOOD PRESSURE: 111 MMHG | BODY MASS INDEX: 40.18 KG/M2 | HEART RATE: 84 BPM | DIASTOLIC BLOOD PRESSURE: 64 MMHG | HEIGHT: 65 IN | OXYGEN SATURATION: 99 % | TEMPERATURE: 99 F

## 2023-09-12 DIAGNOSIS — M05.9 SEROPOSITIVE RHEUMATOID ARTHRITIS: Primary | ICD-10-CM

## 2023-09-12 DIAGNOSIS — K21.9 GASTROESOPHAGEAL REFLUX DISEASE WITHOUT ESOPHAGITIS: ICD-10-CM

## 2023-09-12 DIAGNOSIS — G47.00 INSOMNIA, UNSPECIFIED TYPE: ICD-10-CM

## 2023-09-12 DIAGNOSIS — M79.7 FIBROMYALGIA SYNDROME: ICD-10-CM

## 2023-09-12 DIAGNOSIS — R76.8 RHEUMATOID FACTOR POSITIVE: ICD-10-CM

## 2023-09-12 PROCEDURE — 1160F RVW MEDS BY RX/DR IN RCRD: CPT | Mod: CPTII,S$GLB,, | Performed by: INTERNAL MEDICINE

## 2023-09-12 PROCEDURE — 3074F PR MOST RECENT SYSTOLIC BLOOD PRESSURE < 130 MM HG: ICD-10-PCS | Mod: CPTII,S$GLB,, | Performed by: INTERNAL MEDICINE

## 2023-09-12 PROCEDURE — 99999 PR PBB SHADOW E&M-EST. PATIENT-LVL IV: CPT | Mod: PBBFAC,,, | Performed by: INTERNAL MEDICINE

## 2023-09-12 PROCEDURE — 1159F PR MEDICATION LIST DOCUMENTED IN MEDICAL RECORD: ICD-10-PCS | Mod: CPTII,S$GLB,, | Performed by: INTERNAL MEDICINE

## 2023-09-12 PROCEDURE — 3074F SYST BP LT 130 MM HG: CPT | Mod: CPTII,S$GLB,, | Performed by: INTERNAL MEDICINE

## 2023-09-12 PROCEDURE — 99999 PR PBB SHADOW E&M-EST. PATIENT-LVL IV: ICD-10-PCS | Mod: PBBFAC,,, | Performed by: INTERNAL MEDICINE

## 2023-09-12 PROCEDURE — 3044F HG A1C LEVEL LT 7.0%: CPT | Mod: CPTII,S$GLB,, | Performed by: INTERNAL MEDICINE

## 2023-09-12 PROCEDURE — 3008F PR BODY MASS INDEX (BMI) DOCUMENTED: ICD-10-PCS | Mod: CPTII,S$GLB,, | Performed by: INTERNAL MEDICINE

## 2023-09-12 PROCEDURE — 1160F PR REVIEW ALL MEDS BY PRESCRIBER/CLIN PHARMACIST DOCUMENTED: ICD-10-PCS | Mod: CPTII,S$GLB,, | Performed by: INTERNAL MEDICINE

## 2023-09-12 PROCEDURE — 1159F MED LIST DOCD IN RCRD: CPT | Mod: CPTII,S$GLB,, | Performed by: INTERNAL MEDICINE

## 2023-09-12 PROCEDURE — 99204 OFFICE O/P NEW MOD 45 MIN: CPT | Mod: S$GLB,,, | Performed by: INTERNAL MEDICINE

## 2023-09-12 PROCEDURE — 3008F BODY MASS INDEX DOCD: CPT | Mod: CPTII,S$GLB,, | Performed by: INTERNAL MEDICINE

## 2023-09-12 PROCEDURE — 3078F PR MOST RECENT DIASTOLIC BLOOD PRESSURE < 80 MM HG: ICD-10-PCS | Mod: CPTII,S$GLB,, | Performed by: INTERNAL MEDICINE

## 2023-09-12 PROCEDURE — 3044F PR MOST RECENT HEMOGLOBIN A1C LEVEL <7.0%: ICD-10-PCS | Mod: CPTII,S$GLB,, | Performed by: INTERNAL MEDICINE

## 2023-09-12 PROCEDURE — 99204 PR OFFICE/OUTPT VISIT, NEW, LEVL IV, 45-59 MIN: ICD-10-PCS | Mod: S$GLB,,, | Performed by: INTERNAL MEDICINE

## 2023-09-12 PROCEDURE — 3078F DIAST BP <80 MM HG: CPT | Mod: CPTII,S$GLB,, | Performed by: INTERNAL MEDICINE

## 2023-09-12 RX ORDER — HYDROXYCHLOROQUINE SULFATE 200 MG/1
200 TABLET, FILM COATED ORAL 2 TIMES DAILY
Qty: 60 TABLET | Refills: 5 | Status: SHIPPED | OUTPATIENT
Start: 2023-09-12 | End: 2024-03-14

## 2023-09-12 RX ORDER — TIZANIDINE 4 MG/1
4 TABLET ORAL NIGHTLY
Qty: 30 TABLET | Refills: 5 | Status: SHIPPED | OUTPATIENT
Start: 2023-09-12 | End: 2024-03-10

## 2023-09-12 NOTE — PROGRESS NOTES
"Subjective:       Patient ID: Allison Lopez is a 38 y.o. female.    Chief Complaint: Referral (Referral for abnormal blood tests. Patient states that her legs swell up, anuel knee pain, left ankle pain. Pain 2/10.)    Pt  is complaining of joint pain involving his MCP PIP wrist elbow shoulders hips knees and ankles bilaterally.  Is 5/10 in intensity dull in quality and continuous.  It is associated with a morning stiffness lasting for more than 60 minutes. Pt reports having difficulty maintaining a good night of sleep and this has been associated with myalgia of 5/10 in intensity.  This pain is dull continuous and gets worse mainly at night.  It is associated with fatigue.  No fever no chills no others.    Labs checked during this visit         Review of Systems   Constitutional:  Negative for appetite change, chills and fever.   HENT:  Negative for congestion, ear pain, mouth sores, nosebleeds and trouble swallowing.    Eyes:  Negative for photophobia and discharge.   Respiratory:  Negative for chest tightness and shortness of breath.    Cardiovascular:  Negative for chest pain.   Gastrointestinal:  Negative for abdominal pain and vomiting.   Endocrine: Negative.    Genitourinary:  Negative for hematuria.   Musculoskeletal:         As per HPI   Skin:  Negative for rash.   Neurological:  Negative for weakness.         Objective:   /64 (BP Location: Left arm, Patient Position: Sitting, BP Method: Large (Automatic))   Pulse 84   Temp 98.6 °F (37 °C) (Oral)   Ht 5' 5" (1.651 m)   Wt 109.4 kg (241 lb 3.2 oz)   LMP 09/03/2023   SpO2 99%   BMI 40.14 kg/m²      Physical Exam   Constitutional: She is oriented to person, place, and time. She appears well-developed and well-nourished. No distress.   HENT:   Head: Normocephalic and atraumatic.   Right Ear: External ear normal.   Left Ear: External ear normal.   Eyes: Pupils are equal, round, and reactive to light.   Cardiovascular: Normal rate, regular rhythm and " normal heart sounds.   Pulmonary/Chest: Breath sounds normal.   Abdominal: Soft. There is no abdominal tenderness.   Musculoskeletal:      Right shoulder: Tenderness present.      Left shoulder: Tenderness present.      Right elbow: Tenderness present.      Left elbow: Tenderness present.      Right wrist: Tenderness present.      Left wrist: Tenderness present.      Cervical back: Neck supple.      Right hip: Tenderness present.      Left hip: Tenderness present.      Right knee: Tenderness present.      Left knee: Tenderness present.      Right ankle: Tenderness present.      Left ankle: Tenderness present.   Lymphadenopathy:     She has no cervical adenopathy.   Neurological: She is alert and oriented to person, place, and time. She displays normal reflexes. No cranial nerve deficit or sensory deficit. She exhibits normal muscle tone. Coordination normal.   Skin: No rash noted. No erythema.   Vitals reviewed.      Right Side Rheumatological Exam     The patient is tender to palpation of the shoulder, elbow, wrist, knee, 1st PIP, 1st MCP, 2nd PIP, 2nd MCP, 3rd PIP, 3rd MCP, 4th PIP, 4th MCP, 5th PIP, hip, ankle, 1st MTP, 2nd MTP, 3rd MTP, 4th MTP, 5th MTP, 1st toe IP, 2nd toe IP, 3rd toe IP, 4th toe IP and 5th toe IP    Left Side Rheumatological Exam     The patient is tender to palpation of the shoulder, elbow, wrist, knee, 1st PIP, 1st MCP, 2nd PIP, 2nd MCP, 3rd PIP, 3rd MCP, 4th PIP, 4th MCP, 5th PIP, 5th MCP, hip, ankle, 1st MTP, 2nd MTP, 3rd MTP, 4th MTP, 5th MTP, 1st toe IP, 2nd toe IP, 3rd toe IP, 4th toe IP and 5th toe IP.         Completed Fibromyalgia exam 18/18 tender points.  No data to display     Assessment:       1. Seropositive rheumatoid arthritis    2. Rheumatoid factor positive    3. Insomnia, unspecified type    4. Fibromyalgia syndrome    5. Gastroesophageal reflux disease without esophagitis          Medication List with Changes/Refills   New Medications    HYDROXYCHLOROQUINE (PLAQUENIL) 200  MG TABLET    Take 1 tablet (200 mg total) by mouth 2 (two) times daily. After food       Start Date: 9/12/2023 End Date: 3/14/2024    TIZANIDINE (ZANAFLEX) 4 MG TABLET    Take 1 tablet (4 mg total) by mouth nightly.       Start Date: 9/12/2023 End Date: 3/10/2024   Current Medications    DICLOFENAC SODIUM (VOLTAREN) 1 % GEL    Apply 2 g topically 4 (four) times daily as needed (pain/inflammation).       Start Date: 4/21/2023 End Date: --    MINOXIDIL (ROGAINE) 2 % EXTERNAL SOLUTION    Apply topically 2 (two) times daily.       Start Date: 4/21/2023 End Date: --         Plan:         Problem List Items Addressed This Visit          Immunology/Multi System    Seropositive rheumatoid arthritis - Primary    Relevant Medications    hydroxychloroquine (PLAQUENIL) 200 mg tablet    tiZANidine (ZANAFLEX) 4 MG tablet       GI    Gastroesophageal reflux disease without esophagitis    Relevant Medications    hydroxychloroquine (PLAQUENIL) 200 mg tablet    tiZANidine (ZANAFLEX) 4 MG tablet       Orthopedic    Fibromyalgia syndrome    Relevant Medications    hydroxychloroquine (PLAQUENIL) 200 mg tablet    tiZANidine (ZANAFLEX) 4 MG tablet       Other    Insomnia    Relevant Medications    hydroxychloroquine (PLAQUENIL) 200 mg tablet    tiZANidine (ZANAFLEX) 4 MG tablet     Other Visit Diagnoses       Rheumatoid factor positive        Relevant Medications    hydroxychloroquine (PLAQUENIL) 200 mg tablet    tiZANidine (ZANAFLEX) 4 MG tablet

## 2023-10-27 ENCOUNTER — PATIENT MESSAGE (OUTPATIENT)
Dept: FAMILY MEDICINE | Facility: CLINIC | Age: 38
End: 2023-10-27
Payer: COMMERCIAL

## 2024-03-20 ENCOUNTER — TELEPHONE (OUTPATIENT)
Dept: FAMILY MEDICINE | Facility: CLINIC | Age: 39
End: 2024-03-20
Payer: COMMERCIAL

## 2024-03-20 ENCOUNTER — HOSPITAL ENCOUNTER (EMERGENCY)
Facility: HOSPITAL | Age: 39
Discharge: LEFT BEFORE DISCHARGE | End: 2024-03-20
Payer: COMMERCIAL

## 2024-03-20 VITALS
WEIGHT: 240 LBS | RESPIRATION RATE: 18 BRPM | HEART RATE: 71 BPM | DIASTOLIC BLOOD PRESSURE: 85 MMHG | BODY MASS INDEX: 39.99 KG/M2 | OXYGEN SATURATION: 100 % | HEIGHT: 65 IN | TEMPERATURE: 97 F | SYSTOLIC BLOOD PRESSURE: 127 MMHG

## 2024-03-20 DIAGNOSIS — R00.2 PALPITATIONS: ICD-10-CM

## 2024-03-20 LAB
ALBUMIN SERPL-MCNC: 3.6 G/DL (ref 3.5–5)
ALBUMIN/GLOB SERPL: 0.9 RATIO (ref 1.1–2)
ALP SERPL-CCNC: 59 UNIT/L (ref 40–150)
ALT SERPL-CCNC: 24 UNIT/L (ref 0–55)
APPEARANCE UR: CLEAR
AST SERPL-CCNC: 32 UNIT/L (ref 5–34)
B-HCG SERPL QL: NEGATIVE
BACTERIA #/AREA URNS AUTO: ABNORMAL /HPF
BASOPHILS # BLD AUTO: 0.06 X10(3)/MCL
BASOPHILS NFR BLD AUTO: 1.1 %
BILIRUB SERPL-MCNC: 0.4 MG/DL
BILIRUB UR QL STRIP.AUTO: NEGATIVE
BUN SERPL-MCNC: 7.2 MG/DL (ref 7–18.7)
CALCIUM SERPL-MCNC: 9.1 MG/DL (ref 8.4–10.2)
CHLORIDE SERPL-SCNC: 106 MMOL/L (ref 98–107)
CO2 SERPL-SCNC: 22 MMOL/L (ref 22–29)
COLOR UR AUTO: YELLOW
CREAT SERPL-MCNC: 0.84 MG/DL (ref 0.55–1.02)
EOSINOPHIL # BLD AUTO: 0.14 X10(3)/MCL (ref 0–0.9)
EOSINOPHIL NFR BLD AUTO: 2.5 %
ERYTHROCYTE [DISTWIDTH] IN BLOOD BY AUTOMATED COUNT: 14.6 % (ref 11.5–17)
GFR SERPLBLD CREATININE-BSD FMLA CKD-EPI: >60 MLS/MIN/1.73/M2
GLOBULIN SER-MCNC: 3.8 GM/DL (ref 2.4–3.5)
GLUCOSE SERPL-MCNC: 88 MG/DL (ref 74–100)
GLUCOSE UR QL STRIP.AUTO: NORMAL
HCT VFR BLD AUTO: 39.7 % (ref 37–47)
HGB BLD-MCNC: 12.8 G/DL (ref 12–16)
IMM GRANULOCYTES # BLD AUTO: 0.02 X10(3)/MCL (ref 0–0.04)
IMM GRANULOCYTES NFR BLD AUTO: 0.4 %
KETONES UR QL STRIP.AUTO: ABNORMAL
LEUKOCYTE ESTERASE UR QL STRIP.AUTO: 25
LYMPHOCYTES # BLD AUTO: 2.55 X10(3)/MCL (ref 0.6–4.6)
LYMPHOCYTES NFR BLD AUTO: 45.3 %
MAGNESIUM SERPL-MCNC: 1.6 MG/DL (ref 1.6–2.6)
MCH RBC QN AUTO: 30.3 PG (ref 27–31)
MCHC RBC AUTO-ENTMCNC: 32.2 G/DL (ref 33–36)
MCV RBC AUTO: 93.9 FL (ref 80–94)
MONOCYTES # BLD AUTO: 0.4 X10(3)/MCL (ref 0.1–1.3)
MONOCYTES NFR BLD AUTO: 7.1 %
MUCOUS THREADS URNS QL MICRO: ABNORMAL /LPF
NEUTROPHILS # BLD AUTO: 2.46 X10(3)/MCL (ref 2.1–9.2)
NEUTROPHILS NFR BLD AUTO: 43.6 %
NITRITE UR QL STRIP.AUTO: NEGATIVE
NRBC BLD AUTO-RTO: 0 %
OHS QRS DURATION: 74 MS
OHS QTC CALCULATION: 459 MS
PH UR STRIP.AUTO: 5.5 [PH]
PLATELET # BLD AUTO: 267 X10(3)/MCL (ref 130–400)
PMV BLD AUTO: 10.6 FL (ref 7.4–10.4)
POTASSIUM SERPL-SCNC: 4 MMOL/L (ref 3.5–5.1)
PROT SERPL-MCNC: 7.4 GM/DL (ref 6.4–8.3)
PROT UR QL STRIP.AUTO: NEGATIVE
RBC # BLD AUTO: 4.23 X10(6)/MCL (ref 4.2–5.4)
RBC #/AREA URNS AUTO: ABNORMAL /HPF
RBC UR QL AUTO: ABNORMAL
SODIUM SERPL-SCNC: 139 MMOL/L (ref 136–145)
SP GR UR STRIP.AUTO: 1.03 (ref 1–1.03)
SQUAMOUS #/AREA URNS LPF: ABNORMAL /HPF
TROPONIN I SERPL-MCNC: <0.01 NG/ML (ref 0–0.04)
TSH SERPL-ACNC: 1 UIU/ML (ref 0.35–4.94)
UROBILINOGEN UR STRIP-ACNC: NORMAL
WBC # SPEC AUTO: 5.63 X10(3)/MCL (ref 4.5–11.5)
WBC #/AREA URNS AUTO: ABNORMAL /HPF

## 2024-03-20 PROCEDURE — 93005 ELECTROCARDIOGRAM TRACING: CPT

## 2024-03-20 PROCEDURE — 81001 URINALYSIS AUTO W/SCOPE: CPT

## 2024-03-20 PROCEDURE — 93010 ELECTROCARDIOGRAM REPORT: CPT | Mod: ,,, | Performed by: INTERNAL MEDICINE

## 2024-03-20 PROCEDURE — 81025 URINE PREGNANCY TEST: CPT

## 2024-03-20 PROCEDURE — 84443 ASSAY THYROID STIM HORMONE: CPT

## 2024-03-20 PROCEDURE — 85025 COMPLETE CBC W/AUTO DIFF WBC: CPT

## 2024-03-20 PROCEDURE — 84484 ASSAY OF TROPONIN QUANT: CPT

## 2024-03-20 PROCEDURE — 83735 ASSAY OF MAGNESIUM: CPT

## 2024-03-20 PROCEDURE — 99284 EMERGENCY DEPT VISIT MOD MDM: CPT | Mod: 25

## 2024-03-20 PROCEDURE — 80053 COMPREHEN METABOLIC PANEL: CPT

## 2024-03-20 NOTE — FIRST PROVIDER EVALUATION
Medical screening examination initiated.  I have conducted a focused provider triage encounter, findings are as follows:    Brief history of present illness:  38 year old female presents to ER with palpitations x2 weeks. Patient reports mild CP/SOB. Sent by Dr. Bailey for further evaluation     There were no vitals filed for this visit.    Pertinent physical exam:  Awake and alert, no acute distress    Brief workup plan:  Labs, EKG, chest    Preliminary workup initiated; this workup will be continued and followed by the physician or advanced practice provider that is assigned to the patient when roomed.

## 2024-03-20 NOTE — TELEPHONE ENCOUNTER
----- Message from Yoselin Dutton sent at 3/20/2024 12:23 PM CDT -----  Regarding: advice  .Type:  Needs Medical Advice    Who Called: Pt    Symptoms (please be specific): heart palpitations     How long has patient had these symptoms:      Pharmacy name and phone #:      Would the patient rather a call back or a response via [facility]?     Best Call Back Number: [phone]    Additional Information: pt is experiencing heart palpitations; please advise. Thanks.

## 2024-04-01 ENCOUNTER — TELEPHONE (OUTPATIENT)
Dept: FAMILY MEDICINE | Facility: CLINIC | Age: 39
End: 2024-04-01
Payer: COMMERCIAL

## 2024-04-01 NOTE — TELEPHONE ENCOUNTER
Are there any outstanding tasks in the patients's chart (ex.labs,MM,etc)  No  Do we have outstanding/pending referrals  no  Has the patient been seen in and ER,[facility], or been admitted since last visit  Yes  Has the patient seen any other health care provider(doctors) since last visit  Yes  Has the patient had any bloodwork or x-rays done since last visit  Yes

## 2024-04-02 ENCOUNTER — OFFICE VISIT (OUTPATIENT)
Dept: FAMILY MEDICINE | Facility: CLINIC | Age: 39
End: 2024-04-02
Payer: COMMERCIAL

## 2024-04-02 VITALS
WEIGHT: 242.63 LBS | HEIGHT: 65 IN | HEART RATE: 75 BPM | BODY MASS INDEX: 40.43 KG/M2 | SYSTOLIC BLOOD PRESSURE: 115 MMHG | OXYGEN SATURATION: 98 % | DIASTOLIC BLOOD PRESSURE: 79 MMHG | TEMPERATURE: 99 F

## 2024-04-02 DIAGNOSIS — M25.562 CHRONIC PAIN OF LEFT KNEE: ICD-10-CM

## 2024-04-02 DIAGNOSIS — G89.29 CHRONIC PAIN OF LEFT KNEE: ICD-10-CM

## 2024-04-02 DIAGNOSIS — R00.2 PALPITATIONS: Primary | ICD-10-CM

## 2024-04-02 PROBLEM — C50.011: Status: ACTIVE | Noted: 2024-04-02

## 2024-04-02 PROBLEM — C50.011: Status: RESOLVED | Noted: 2024-04-02 | Resolved: 2024-04-02

## 2024-04-02 PROBLEM — Z17.1: Status: ACTIVE | Noted: 2024-04-02

## 2024-04-02 PROBLEM — Z17.1: Status: RESOLVED | Noted: 2024-04-02 | Resolved: 2024-04-02

## 2024-04-02 PROBLEM — C50.012: Status: RESOLVED | Noted: 2024-04-02 | Resolved: 2024-04-02

## 2024-04-02 PROBLEM — C50.012: Status: ACTIVE | Noted: 2024-04-02

## 2024-04-02 PROCEDURE — 3008F BODY MASS INDEX DOCD: CPT | Mod: CPTII,,,

## 2024-04-02 PROCEDURE — 99215 OFFICE O/P EST HI 40 MIN: CPT | Mod: ,,,

## 2024-04-02 PROCEDURE — 1159F MED LIST DOCD IN RCRD: CPT | Mod: CPTII,,,

## 2024-04-02 PROCEDURE — 3074F SYST BP LT 130 MM HG: CPT | Mod: CPTII,,,

## 2024-04-02 PROCEDURE — 1160F RVW MEDS BY RX/DR IN RCRD: CPT | Mod: CPTII,,,

## 2024-04-02 PROCEDURE — 3078F DIAST BP <80 MM HG: CPT | Mod: CPTII,,,

## 2024-04-02 RX ORDER — SULFAMETHOXAZOLE AND TRIMETHOPRIM 800; 160 MG/1; MG/1
1 TABLET ORAL DAILY
Qty: 7 TABLET | Refills: 0 | Status: SHIPPED | OUTPATIENT
Start: 2024-04-02 | End: 2024-04-02 | Stop reason: CLARIF

## 2024-04-02 RX ORDER — DICLOFENAC SODIUM 10 MG/G
2 GEL TOPICAL 4 TIMES DAILY PRN
Qty: 100 G | Refills: 1 | Status: SHIPPED | OUTPATIENT
Start: 2024-04-02

## 2024-04-02 NOTE — PROGRESS NOTES
Patient ID: [id number]     Chief Complaint: Hospital Follow up Palpitations    HPI:     Allison Lopez is a 38 y.o. female here today for a hospital follow. The palpitations began approximately three weeks ago. Gradual onset and resolved without intervention. On 3/20/2024, she presented to [facility] emergency room complaining of heart palpitations, mild chest pain and shortness of breath. Today she is not experiencing palpitations, no report of episode since ER visit. She denies a family history of heart disease, denies stressors, denies caffeine, energy drinks, denies fever, dizziness, fainting, nausea or vomiting. She reports left knee pain which she has been taking aleve daily for pain.  Reviewed and discussed labs. No other concerns expressed at this visit.    Past Medical History:   Diagnosis Date    Abnormal Pap smear 2011    Colposcopy and cold cone    Arthritis     History of gonorrhea     Tobacco use disorder         Past Surgical History:   Procedure Laterality Date     SECTION, LOW TRANSVERSE      x 1    TONSILLECTOMY          Social History     Socioeconomic History    Marital status: Single    Number of children: 1   Occupational History    Occupation: IT     Employer: [employer]     Comment: [employer]   Tobacco Use    Smoking status: Former     Current packs/day: 0.20     Average packs/day: 0.2 packs/day for 1 year (0.2 ttl pk-yrs)     Types: Cigarettes    Smokeless tobacco: Never   Substance and Sexual Activity    Alcohol use: Yes     Alcohol/week: 2.5 standard drinks of alcohol     Types: 3 drink(s) per week     Comment: Occasionally    Drug use: Never    Sexual activity: Yes     Partners: Male     Birth control/protection: Condom     Comment: 1 partner     Social Determinants of Health     Financial Resource Strain: Low Risk  (2023)    Overall Financial Resource Strain (CARDIA)     Difficulty of Paying Living Expenses: Not very hard   Food Insecurity: No Food Insecurity (2023)    Hunger Vital  Sign     Worried About Running Out of Food in the Last Year: Never true     Ran Out of Food in the Last Year: Never true   Transportation Needs: No Transportation Needs (4/21/2023)    PRAPARE - Transportation     Lack of Transportation (Medical): No     Lack of Transportation (Non-Medical): No   Physical Activity: Insufficiently Active (4/21/2023)    Exercise Vital Sign     Days of Exercise per Week: 3 days     Minutes of Exercise per Session: 40 min   Stress: No Stress Concern Present (4/21/2023)    [employer] [place] [employer] - Occupational Stress Questionnaire     Feeling of Stress : Not at all   Social Connections: Socially Isolated (4/21/2023)    Social Connection and Isolation Panel [NHANES]     Frequency of Communication with Friends and Family: Once a week     Frequency of Social Gatherings with Friends and Family: Once a week     Attends Quaker Services: Never     Active Member of Clubs or Organizations: No     Attends Club or Organization Meetings: Never     Marital Status: Never    Housing Stability: Low Risk  (4/21/2023)    Housing Stability Vital Sign     Unable to Pay for Housing in the Last Year: No     Number of Places Lived in the Last Year: 1     Unstable Housing in the Last Year: No        Current Outpatient Medications   Medication Instructions    diclofenac sodium (VOLTAREN) 2 g, Topical (Top), 4 times daily PRN    minoxidiL (ROGAINE) 2 % external solution Topical (Top), 2 times daily       Review of patient's allergies indicates:  No Known Allergies     Patient Care Team:  Yaquelin Bailey MD as PCP - General     Subjective:     Review of Systems   Respiratory: Negative.     Cardiovascular:  Negative for palpitations and leg swelling.   Gastrointestinal: Negative.    Musculoskeletal:         Left knee pain   Neurological: Negative.    Endo/Heme/Allergies:  Negative for polydipsia.       See HPI for details        All Other ROS: Negative except as stated in HPI.  "      Objective:     Visit Vitals  /79 (BP Location: Right arm, Patient Position: Sitting, BP Method: Large (Automatic))   Pulse 75   Temp 98.5 °F (36.9 °C)   Ht 5' 5" (1.651 m)   Wt 110 kg (242 lb 9.6 oz)   LMP 03/15/2024 (Approximate)   SpO2 98%   BMI 40.37 kg/m²       Physical Exam  Vitals reviewed.   HENT:      Mouth/Throat:      Mouth: Mucous membranes are moist.   Eyes:      Extraocular Movements: Extraocular movements intact.      Conjunctiva/sclera: Conjunctivae normal.      Pupils: Pupils are equal, round, and reactive to light.   Cardiovascular:      Rate and Rhythm: Normal rate and regular rhythm.      Pulses: Normal pulses.      Heart sounds: Normal heart sounds.   Pulmonary:      Breath sounds: Normal breath sounds.   Abdominal:      General: Bowel sounds are normal.   Musculoskeletal:         General: Normal range of motion.      Comments: Left knee tenderness upon palpation.   Skin:     General: Skin is warm and dry.      Capillary Refill: Capillary refill takes less than 2 seconds.   Neurological:      General: No focal deficit present.      Mental Status: She is alert and oriented to person, place, and time.   Psychiatric:         Mood and Affect: Mood normal.           Assessment:       ICD-10-CM ICD-9-CM   1. Palpitations  R00.2 785.1   2. Chronic pain of left knee  M25.562 719.46    G89.29 338.29        Plan:     1. Palpitations  Assessment & Plan:  Cardiology referral for further cardiac workup.  Present to the emergency room if you experience increased heart rate, chest pain, shortness of breath, dizziness, fainting, fever greater than 100.4.  Follow low sodium, low fat diet.   Avoid caffeine drinks, stimulants, energy drinks. Increase water intake to maintain hydration. Increase fluid intake to maintain hydration. Monitor for signs of dehydration including dark colored urine, weakness, lethargy, dizziness, etc.       Orders:  -     Ambulatory referral/consult to Cardiology; Future; " Expected date: 04/11/2024    2. Chronic pain of left knee  Assessment & Plan:  Apply diclofenac gel 2 grams to knee 4 times a day as needed for pain.  Reschedule appointment with Rheumatologist.  Notify MD or present to the emergency room if increased pain, swelling occurs.    Orders:  -     diclofenac sodium (VOLTAREN) 1 % Gel; Apply 2 g topically 4 (four) times daily as needed (pain/inflammation).  Dispense: 100 g; Refill: 1    Other orders  -     Discontinue: sulfamethoxazole-trimethoprim 800-160mg (BACTRIM DS) 800-160 mg Tab; Take 1 tablet by mouth once daily. for 7 days  Dispense: 7 tablet; Refill: 0               Follow up in about 6 months (around 10/2/2024) for wellness.  In addition to their scheduled follow up, the patient has also been instructed to follow up on as needed basis.     Future Appointments   Date Time Provider Department Center   10/2/2024  8:15 AM Yaquelin Bailey MD [facility]

## 2024-04-04 NOTE — ASSESSMENT & PLAN NOTE
Apply diclofenac gel 2 grams to knee 4 times a day as needed for pain.  Reschedule appointment with Rheumatologist.  Notify MD or present to the emergency room if increased pain, swelling occurs.

## 2024-04-04 NOTE — ASSESSMENT & PLAN NOTE
Cardiology referral for further cardiac workup.  Present to the emergency room if you experience increased heart rate, chest pain, shortness of breath, dizziness, fainting, fever greater than 100.4.  Follow low sodium, low fat diet.   Avoid caffeine drinks, stimulants, energy drinks. Increase water intake to maintain hydration. Increase fluid intake to maintain hydration. Monitor for signs of dehydration including dark colored urine, weakness, lethargy, dizziness, etc.

## 2024-07-11 ENCOUNTER — HOSPITAL ENCOUNTER (OUTPATIENT)
Dept: RADIOLOGY | Facility: CLINIC | Age: 39
Discharge: HOME | End: 2024-07-11
Attending: PHYSICIAN ASSISTANT
Payer: COMMERCIAL

## 2024-07-11 ENCOUNTER — OFFICE VISIT (OUTPATIENT)
Dept: ORTHOPEDICS | Facility: CLINIC | Age: 39
End: 2024-07-11
Payer: COMMERCIAL

## 2024-07-11 VITALS
WEIGHT: 242.5 LBS | DIASTOLIC BLOOD PRESSURE: 85 MMHG | SYSTOLIC BLOOD PRESSURE: 119 MMHG | HEART RATE: 73 BPM | BODY MASS INDEX: 40.4 KG/M2 | HEIGHT: 65 IN

## 2024-07-11 DIAGNOSIS — M25.572 LEFT ANKLE PAIN, UNSPECIFIED CHRONICITY: Primary | ICD-10-CM

## 2024-07-11 DIAGNOSIS — M21.42 PES PLANOVALGUS, ACQUIRED, LEFT: ICD-10-CM

## 2024-07-11 DIAGNOSIS — S93.402A SPRAIN OF LEFT ANKLE, UNSPECIFIED LIGAMENT, INITIAL ENCOUNTER: ICD-10-CM

## 2024-07-11 DIAGNOSIS — M21.6X2 PRONATION OF LEFT FOOT: ICD-10-CM

## 2024-07-11 DIAGNOSIS — M25.572 LEFT ANKLE PAIN, UNSPECIFIED CHRONICITY: ICD-10-CM

## 2024-07-11 PROCEDURE — 1159F MED LIST DOCD IN RCRD: CPT | Mod: CPTII,,, | Performed by: PHYSICIAN ASSISTANT

## 2024-07-11 PROCEDURE — 3074F SYST BP LT 130 MM HG: CPT | Mod: CPTII,,, | Performed by: PHYSICIAN ASSISTANT

## 2024-07-11 PROCEDURE — 99203 OFFICE O/P NEW LOW 30 MIN: CPT | Mod: ,,, | Performed by: PHYSICIAN ASSISTANT

## 2024-07-11 PROCEDURE — 3079F DIAST BP 80-89 MM HG: CPT | Mod: CPTII,,, | Performed by: PHYSICIAN ASSISTANT

## 2024-07-11 PROCEDURE — 1160F RVW MEDS BY RX/DR IN RCRD: CPT | Mod: CPTII,,, | Performed by: PHYSICIAN ASSISTANT

## 2024-07-11 PROCEDURE — 3008F BODY MASS INDEX DOCD: CPT | Mod: CPTII,,, | Performed by: PHYSICIAN ASSISTANT

## 2024-07-11 PROCEDURE — 73610 X-RAY EXAM OF ANKLE: CPT | Mod: LT,,, | Performed by: PHYSICIAN ASSISTANT

## 2024-07-11 NOTE — PROGRESS NOTES
"Chief Complaint:   Chief Complaint   Patient presents with    Ankle Injury     L ankle ongoing for about 3 wks ago. Was doing box jumps. Not sure how it happened. She noticed swelling and bruise on the medial aspect of the ankle. It has been getting a little better. Did have a tingling/burning sensation       History of present illness:    This is a 39 y.o. year old female who complains of left ankle pain after an injury while doing box jumps at her workout facility.    She states the injury is about 3 weeks ago and she did have a fall she injured her left ankle and the anterior aspect of her lower leg pain   The scrape/wound on the anterior aspect of her tibia is healing but she was still having some mild medial-sided ankle pain which she states has gotten a lot better since her previous state on the date of injury      Current Outpatient Medications   Medication Sig    diclofenac sodium (VOLTAREN) 1 % Gel Apply 2 g topically 4 (four) times daily as needed (pain/inflammation). (Patient not taking: Reported on 7/11/2024)     No current facility-administered medications for this visit.       Review of Systems:    Constitution:   Denies chills, fever, and sweats.  HENT:   Denies headaches or blurry vision.  Cardiovascular:  Denies chest pain or irregular heart beat.  Respiratory:   Denies cough or shortness of breath.  Gastrointestinal:  Denies abdominal pain, nausea, or vomiting.  Musculoskeletal:   Denies muscle cramps.  Neurological:   Denies dizziness or focal weakness.  Psychiatric/Behavior: Normal mental status.  Hematology/Lymph:  Denies bleeding problem or easy bruising/bleeding.  Skin:    Denies rash or suspicious lesions.    Examination:    Vital Signs:    Vitals:    07/11/24 0926   BP: 119/85   Pulse: 73   Weight: 110 kg (242 lb 8.1 oz)   Height: 5' 5" (1.651 m)       Body mass index is 40.36 kg/m².    Constitution:   Well-developed, well nourished patient in no acute distress.  Neurological:   Alert and " oriented x 3 and cooperative to examination.     Psychiatric/Behavior: Normal mental status.  Respiratory:   No shortness of breath.  Eyes:    Extraoccular muscles intact  Skin:    No scars, rash or suspicious lesions.    Physical Exam:   Left ankle  No obvious deformity. Plantar flexion and dorsiflexion is 60 degrees and 30 degrees, respectively.  Negative squeeze test.  Negative lateral malleolus tenderness.  Negative medial malleolus tenderness.  Negative talofibular ligament tenderness.  Negative anterior draw and lateral tilt.  Positive has a palpation of the medial aspect of the ankle in the area of the deltoid ligament.    No midfoot tenderness.    No Achilles tenderness.    Brisk capillary refill to all the digits  5/5 strength, normal skin appearance and palpable pulses distally.  Sensibility normal.  With weight-bearing patient does have pes planus and hindfoot pronation      Imaging: X-rays ordered and images interpreted today personally by me of left ankle three views.    No acute osseous abnormalities noted.            Assessment: Left ankle pain, unspecified chronicity  -     X-Ray Ankle Complete Left; Future; Expected date: 07/11/2024    Sprain of left ankle, unspecified ligament, initial encounter    Pronation of left foot    Pes planovalgus, acquired, left         Plan:  At this point the patient more likely has a medial-sided ankle sprain in addition to some Horsham valgus alignment issues   She needs to wear a shoe at all times with the arch support for her flat feet and for her pronated it alignment.    As long as she was continuing to improve and the pain goes away she can continue doing her exercises that she has been but would recommend she do low-impact activities for the next 2-4 weeks until her ankle pain completely resolves.    If she continues to have problems in the future she will call the office we will consider advanced imaging          DISCLAIMER: This note may have been dictated using  voice recognition software and may contain grammatical errors.     NOTE: Consult report sent to referring provider via [employer] EMR.

## 2024-09-26 DIAGNOSIS — Z00.00 LABORATORY EXAMINATION ORDERED AS PART OF A ROUTINE GENERAL MEDICAL EXAMINATION: Primary | ICD-10-CM

## 2024-10-02 ENCOUNTER — OFFICE VISIT (OUTPATIENT)
Dept: FAMILY MEDICINE | Facility: CLINIC | Age: 39
End: 2024-10-02
Payer: COMMERCIAL

## 2024-10-02 VITALS
HEIGHT: 65 IN | RESPIRATION RATE: 16 BRPM | HEART RATE: 74 BPM | OXYGEN SATURATION: 99 % | SYSTOLIC BLOOD PRESSURE: 107 MMHG | DIASTOLIC BLOOD PRESSURE: 72 MMHG | BODY MASS INDEX: 41.48 KG/M2 | WEIGHT: 249 LBS

## 2024-10-02 DIAGNOSIS — M05.9 SEROPOSITIVE RHEUMATOID ARTHRITIS: ICD-10-CM

## 2024-10-02 DIAGNOSIS — E66.813 CLASS 3 SEVERE OBESITY DUE TO EXCESS CALORIES WITHOUT SERIOUS COMORBIDITY WITH BODY MASS INDEX (BMI) OF 40.0 TO 44.9 IN ADULT: ICD-10-CM

## 2024-10-02 DIAGNOSIS — E66.01 CLASS 3 SEVERE OBESITY DUE TO EXCESS CALORIES WITHOUT SERIOUS COMORBIDITY WITH BODY MASS INDEX (BMI) OF 40.0 TO 44.9 IN ADULT: ICD-10-CM

## 2024-10-02 DIAGNOSIS — Z00.00 WELLNESS EXAMINATION: Primary | ICD-10-CM

## 2024-10-02 DIAGNOSIS — R82.71 BACTERIA IN URINE: ICD-10-CM

## 2024-10-02 LAB
25(OH)D3+25(OH)D2 SERPL-MCNC: 17 NG/ML (ref 30–80)
ALBUMIN SERPL-MCNC: 3.4 G/DL (ref 3.5–5)
ALBUMIN/GLOB SERPL: 0.9 RATIO (ref 1.1–2)
ALP SERPL-CCNC: 59 UNIT/L (ref 40–150)
ALT SERPL-CCNC: 17 UNIT/L (ref 0–55)
ANION GAP SERPL CALC-SCNC: 8 MEQ/L
AST SERPL-CCNC: 24 UNIT/L (ref 5–34)
BACTERIA #/AREA URNS AUTO: ABNORMAL /HPF
BASOPHILS # BLD AUTO: 0.05 X10(3)/MCL
BASOPHILS NFR BLD AUTO: 1.2 %
BILIRUB SERPL-MCNC: 0.4 MG/DL
BILIRUB UR QL STRIP.AUTO: NEGATIVE
BUN SERPL-MCNC: 9 MG/DL (ref 7–18.7)
CALCIUM SERPL-MCNC: 9.1 MG/DL (ref 8.4–10.2)
CHLORIDE SERPL-SCNC: 107 MMOL/L (ref 98–107)
CHOLEST SERPL-MCNC: 211 MG/DL
CHOLEST/HDLC SERPL: 4 {RATIO} (ref 0–5)
CLARITY UR: ABNORMAL
CO2 SERPL-SCNC: 27 MMOL/L (ref 22–29)
COLOR UR AUTO: ABNORMAL
CREAT SERPL-MCNC: 0.92 MG/DL (ref 0.55–1.02)
CREAT/UREA NIT SERPL: 10
EOSINOPHIL # BLD AUTO: 0.15 X10(3)/MCL (ref 0–0.9)
EOSINOPHIL NFR BLD AUTO: 3.5 %
ERYTHROCYTE [DISTWIDTH] IN BLOOD BY AUTOMATED COUNT: 14.3 % (ref 11.5–17)
EST. AVERAGE GLUCOSE BLD GHB EST-MCNC: 96.8 MG/DL
GFR SERPLBLD CREATININE-BSD FMLA CKD-EPI: >60 ML/MIN/1.73/M2
GLOBULIN SER-MCNC: 3.6 GM/DL (ref 2.4–3.5)
GLUCOSE SERPL-MCNC: 104 MG/DL (ref 74–100)
GLUCOSE UR QL STRIP: NORMAL
HBA1C MFR BLD: 5 %
HCT VFR BLD AUTO: 38.8 % (ref 37–47)
HDLC SERPL-MCNC: 57 MG/DL (ref 35–60)
HGB BLD-MCNC: 12.4 G/DL (ref 12–16)
HGB UR QL STRIP: NEGATIVE
IMM GRANULOCYTES # BLD AUTO: 0.01 X10(3)/MCL (ref 0–0.04)
IMM GRANULOCYTES NFR BLD AUTO: 0.2 %
KETONES UR QL STRIP: NEGATIVE
LDLC SERPL CALC-MCNC: 137 MG/DL (ref 50–140)
LEUKOCYTE ESTERASE UR QL STRIP: NEGATIVE
LYMPHOCYTES # BLD AUTO: 1.73 X10(3)/MCL (ref 0.6–4.6)
LYMPHOCYTES NFR BLD AUTO: 40.2 %
MCH RBC QN AUTO: 30.4 PG (ref 27–31)
MCHC RBC AUTO-ENTMCNC: 32 G/DL (ref 33–36)
MCV RBC AUTO: 95.1 FL (ref 80–94)
MONOCYTES # BLD AUTO: 0.32 X10(3)/MCL (ref 0.1–1.3)
MONOCYTES NFR BLD AUTO: 7.4 %
NEUTROPHILS # BLD AUTO: 2.04 X10(3)/MCL (ref 2.1–9.2)
NEUTROPHILS NFR BLD AUTO: 47.5 %
NITRITE UR QL STRIP: NEGATIVE
NRBC BLD AUTO-RTO: 0 %
PH UR STRIP: 5.5 [PH]
PLATELET # BLD AUTO: 240 X10(3)/MCL (ref 130–400)
PMV BLD AUTO: 11.3 FL (ref 7.4–10.4)
POTASSIUM SERPL-SCNC: 4.4 MMOL/L (ref 3.5–5.1)
PROT SERPL-MCNC: 7 GM/DL (ref 6.4–8.3)
PROT UR QL STRIP: NEGATIVE
RBC # BLD AUTO: 4.08 X10(6)/MCL (ref 4.2–5.4)
RBC #/AREA URNS AUTO: ABNORMAL /HPF
SODIUM SERPL-SCNC: 142 MMOL/L (ref 136–145)
SP GR UR STRIP.AUTO: 1.02 (ref 1–1.03)
SQUAMOUS #/AREA URNS LPF: ABNORMAL /HPF
TRIGL SERPL-MCNC: 84 MG/DL (ref 37–140)
TSH SERPL-ACNC: 1.37 UIU/ML (ref 0.35–4.94)
UROBILINOGEN UR STRIP-ACNC: NORMAL
VLDLC SERPL CALC-MCNC: 17 MG/DL
WBC # BLD AUTO: 4.3 X10(3)/MCL (ref 4.5–11.5)
WBC #/AREA URNS AUTO: ABNORMAL /HPF

## 2024-10-02 PROCEDURE — 81001 URINALYSIS AUTO W/SCOPE: CPT | Performed by: FAMILY MEDICINE

## 2024-10-02 PROCEDURE — 82306 VITAMIN D 25 HYDROXY: CPT | Performed by: FAMILY MEDICINE

## 2024-10-02 PROCEDURE — 85025 COMPLETE CBC W/AUTO DIFF WBC: CPT | Performed by: FAMILY MEDICINE

## 2024-10-02 PROCEDURE — 1159F MED LIST DOCD IN RCRD: CPT | Mod: CPTII,,, | Performed by: FAMILY MEDICINE

## 2024-10-02 PROCEDURE — 36415 COLL VENOUS BLD VENIPUNCTURE: CPT | Mod: ,,, | Performed by: FAMILY MEDICINE

## 2024-10-02 PROCEDURE — 80053 COMPREHEN METABOLIC PANEL: CPT | Performed by: FAMILY MEDICINE

## 2024-10-02 PROCEDURE — 80061 LIPID PANEL: CPT | Performed by: FAMILY MEDICINE

## 2024-10-02 PROCEDURE — 87086 URINE CULTURE/COLONY COUNT: CPT | Performed by: FAMILY MEDICINE

## 2024-10-02 PROCEDURE — 83036 HEMOGLOBIN GLYCOSYLATED A1C: CPT | Performed by: FAMILY MEDICINE

## 2024-10-02 PROCEDURE — 1160F RVW MEDS BY RX/DR IN RCRD: CPT | Mod: CPTII,,, | Performed by: FAMILY MEDICINE

## 2024-10-02 PROCEDURE — 3008F BODY MASS INDEX DOCD: CPT | Mod: CPTII,,, | Performed by: FAMILY MEDICINE

## 2024-10-02 PROCEDURE — 84443 ASSAY THYROID STIM HORMONE: CPT | Performed by: FAMILY MEDICINE

## 2024-10-02 PROCEDURE — 99395 PREV VISIT EST AGE 18-39: CPT | Mod: ,,, | Performed by: FAMILY MEDICINE

## 2024-10-02 PROCEDURE — 3078F DIAST BP <80 MM HG: CPT | Mod: CPTII,,, | Performed by: FAMILY MEDICINE

## 2024-10-02 PROCEDURE — 3074F SYST BP LT 130 MM HG: CPT | Mod: CPTII,,, | Performed by: FAMILY MEDICINE

## 2024-10-02 PROCEDURE — 36415 COLL VENOUS BLD VENIPUNCTURE: CPT | Performed by: FAMILY MEDICINE

## 2024-10-02 NOTE — PROGRESS NOTES
Patient ID: [id number]     Chief Complaint: Annual Exam        HPI:     Allison Lopez is a 39 y.o. female here today for annual wellness exam.  Well Adult History   The patient presents for well adult exam. The patient's general health status is described as good. The patient's diet is described as balanced. Exercise: occasional. Associated symptoms consist of reports 60 pound intentional weight loss with lifestyle modifications, denies weight gain, denies fatigue, denies headache, denies snoring, denies witnessed apnea, denies hearing loss and denies vision changes. Last menstrual period: 09/23/2024, regular. Additional pertinent history: last dental exam: > 6 months ago, she has dental insurance, she will schedule an appt. next available, last eye exam: >2 years ago (wears reading eyeglasses, she has vision insurance, she will schedule an appt. next available), last pap smear : 2023 (WNL with negative HPV with GYN- Dr. Lanier), seat belt use, daily caffeine use (coffee), tobacco use none- quit smoking cigarettes in 2021, social alcohol use and She is fasting for annual wellness exam. She would not like STD screening. She refuses flu vaccine and COVID-19 booster, she is UTD on all other vaccines. She is obese, gained 7 pounds since last visit due to non-compliance with diet, she is not interested in surgical weight loss, weight loss Rx, or seeing a dietician, she has started a boot camp program and she is working on lifestyle changes.   - She has a history of Rheumatoid arthritis, stable, was seeing Dr. Pan (Rheumatology), needs new Rheumatologist.   - Patient is without any other complaints today.     Advance Care Planning     Date: 10/02/2024  Patient did not wish or was not able to name a surrogate decision maker or provide an Advance Care Plan.        -------------------------------------    Abnormal Pap smear    Colposcopy and cold cone    Arthritis    History of gonorrhea    Tobacco use disorder         Past Surgical History:   Procedure Laterality Date    APPENDECTOMY       SECTION, LOW TRANSVERSE      x 1    TONSILLECTOMY         Review of patient's allergies indicates:  No Known Allergies    No outpatient medications have been marked as taking for the 10/2/24 encounter (Office Visit) with Yaquelin Bailey MD.       Social History     Socioeconomic History    Marital status: Single    Number of children: 1   Occupational History    Occupation: IT     Employer: [employer]     Comment: [employer]   Tobacco Use    Smoking status: Former     Current packs/day: 0.20     Average packs/day: 0.2 packs/day for 1 year (0.2 ttl pk-yrs)     Types: Cigarettes    Smokeless tobacco: Never   Substance and Sexual Activity    Alcohol use: Yes     Alcohol/week: 9.0 standard drinks of alcohol     Types: 5 Glasses of wine, 4 Shots of liquor per week     Comment: Occasionally    Drug use: No    Sexual activity: Yes     Partners: Male     Birth control/protection: Condom     Comment: 1 partner     Social Drivers of Health     Financial Resource Strain: Low Risk  (2023)    Overall Financial Resource Strain (CARDIA)     Difficulty of Paying Living Expenses: Not very hard   Food Insecurity: No Food Insecurity (2023)    Hunger Vital Sign     Worried About Running Out of Food in the Last Year: Never true     Ran Out of Food in the Last Year: Never true   Transportation Needs: No Transportation Needs (2023)    PRAPARE - Transportation     Lack of Transportation (Medical): No     Lack of Transportation (Non-Medical): No   Physical Activity: Insufficiently Active (2023)    Exercise Vital Sign     Days of Exercise per Week: 3 days     Minutes of Exercise per Session: 40 min   Stress: No Stress Concern Present (2023)    [employer] - Occupational Stress Questionnaire     Feeling of Stress : Not at all   Housing Stability: Low Risk  (2023)    Housing Stability Vital Sign     Unable to  "Pay for Housing in the Last Year: No     Number of Places Lived in the Last Year: 1     Unstable Housing in the Last Year: No        Family History   Problem Relation Name Age of Onset    Cancer Mother Latrice     Mental illness Mother Latrice     Miscarriages / Stillbirths Mother Latrice     Diabetes Sister Jaye     No Known Problems Daughter      Hypertension Maternal Grandmother Ely     Diabetes Maternal Grandfather Ian     Stroke Neg Hx      Heart disease Neg Hx          Subjective:       Review of Systems:    See HPI for details    Constitutional: Denies Change in appetite. Denies Chills. Denies Fever. Denies Night sweats.  Eye: Denies Blurred vision. Denies Discharge. Denies Eye pain.  ENT: Denies Decreased hearing. Denies Sore throat. Denies Swollen glands.  Respiratory: Denies Cough. Denies Shortness of breath. Denies Shortness of breath with exertion. Denies Wheezing.  Cardiovascular: Denies Chest pain at rest. Denies Chest pain with exertion. Denies Irregular heartbeat. Denies Palpitations.  Gastrointestinal: Denies Abdominal pain. Denies Diarrhea. Denies Nausea. Denies Vomiting. Denies Hematemesis or Hematochezia.  Genitourinary: Denies Dysuria. Denies Urinary frequency. Denies Urinary urgency. Denies Blood in urine.  Endocrine: Denies Cold intolerance. Denies Excessive thirst. Denies Heat intolerance. Denies Weight loss. Denies Weight gain.  Musculoskeletal: Denies Painful joints. Denies Weakness.  Integumentary: Denies Rash. Denies Itching. Denies Dry skin.  Neurologic: Denies Dizziness. Denies Fainting. Denies Headache.  Psychiatric: Denies Depression. Denies Anxiety. Denies Suicidal/Homicidal ideations.    All Other ROS: Negative except as stated in HPI.       Objective:     /72 (BP Location: Right arm, Patient Position: Sitting)   Pulse 74   Resp 16   Ht 5' 5" (1.651 m)   Wt 112.9 kg (249 lb)   LMP 09/23/2024 (Exact Date)   SpO2 99%   BMI 41.44 kg/m²     Physical Exam    General: " Alert and oriented, No acute distress. Obese.   Head: Normocephalic, Atraumatic.  Eye: Pupils are equal, round and reactive to light, Extraocular movements are intact, Sclera non-icteric.  Ears/Nose/Throat: Normal, Mucosa moist,Clear.  Neck/Thyroid: Supple, Non-tender, No carotid bruit, No palpable thyromegaly or thyroid nodule, No lymphadenopathy, No JVD, Full range of motion.  Respiratory: Clear to auscultation bilaterally; No wheezes, rales or rhonchi,Non-labored respirations, Symmetrical chest wall expansion.  Cardiovascular: Regular rate and rhythm, S1/S2 normal, No murmurs, rubs or gallops.  Gastrointestinal: Soft, Non-tender, Non-distended, Normal bowel sounds, No palpable organomegaly.  Musculoskeletal: Normal range of motion.  Integumentary: Warm, Dry, Intact, No suspicious lesions or rashes.  Extremities: No clubbing, cyanosis or edema  Neurologic: No focal deficits, Cranial Nerves II-XII are grossly intact, Motor strength normal upper and lower extremities, Sensory exam intact.  Psychiatric: Normal interaction, Coherent speech, Euthymic mood, Appropriate affect         Assessment:       ICD-10-CM ICD-9-CM   1. Wellness examination  Z00.00 V70.0   2. Seropositive rheumatoid arthritis  M05.9 714.0   3. Class 3 severe obesity due to excess calories without serious comorbidity with body mass index (BMI) of 40.0 to 44.9 in adult  E66.813 278.01    E66.01 V85.41    Z68.41         Plan:     Problem List Items Addressed This Visit          Immunology/Multi System    Seropositive rheumatoid arthritis    Relevant Orders    Ambulatory referral/consult to Rheumatology       Endocrine    Obesity    Relevant Orders    Vitamin D     Other Visit Diagnoses       Wellness examination    -  Primary    Relevant Orders    CBC Auto Differential    Comprehensive Metabolic Panel    Hemoglobin A1C    Lipid Panel    TSH    Urinalysis, Reflex to Urine Culture         1. Wellness examination  - CBC Auto Differential; Future  -  Comprehensive Metabolic Panel; Future  - Hemoglobin A1C; Future  - Lipid Panel; Future  - TSH; Future  - Urinalysis, Reflex to Urine Culture; Future  - Will treat pending lab results. Monthly breast self exam encouraged. Diet, exercise, and 10% weight loss encouraged. Keep appointment for dental exams x q6 months as scheduled. Keep appointment for annual eye exam as scheduled. Keep appointment with GYN for annual pap smear, MMG as scheduled. Notify M.D. or ER if temp greater than 100.4, or any acute illness.      2. Seropositive rheumatoid arthritis  - Ambulatory referral/consult to Rheumatology; Future for evaluation and treatment    3. Class 3 severe obesity due to excess calories without serious comorbidity with body mass index (BMI) of 40.0 to 44.9 in adult  - Vitamin D; Future  Body mass index is 41.44 kg/m².  Goal BMI <30.  Exercise 5 times a week for 30 minutes per day.  Avoid soda, simple sugars, excessive rice, potatoes or bread. Limit fast foods and fried foods.  Choose complex carbs in moderation (example: green vegetables, beans, oatmeal). Eat plenty of fresh fruits and vegetables with lean meats daily.  Do not skip meals. Eat a balanced portion size.  Avoid fad diets. Consider permanent healthy life style changes.        Allison was seen today for annual exam.    Diagnoses and all orders for this visit:    Wellness examination  -     CBC Auto Differential; Future  -     Comprehensive Metabolic Panel; Future  -     Hemoglobin A1C; Future  -     Lipid Panel; Future  -     TSH; Future  -     Urinalysis, Reflex to Urine Culture; Future    Seropositive rheumatoid arthritis  -     Ambulatory referral/consult to Rheumatology; Future    Class 3 severe obesity due to excess calories without serious comorbidity with body mass index (BMI) of 40.0 to 44.9 in adult  -     Vitamin D; Future          Medication List with Changes/Refills   Discontinued Medications    DICLOFENAC SODIUM (VOLTAREN) 1 % GEL    Apply 2 g  topically 4 (four) times daily as needed (pain/inflammation).       Start Date: 4/2/2024  End Date: 10/2/2024          Follow up in about 1 year (around 10/2/2025) for Wellness, *Pap smear from GYN (Dr. Barajas)*.

## 2024-10-02 NOTE — LETTER
AUTHORIZATION FOR RELEASE OF   CONFIDENTIAL INFORMATION    Dear Medical records,    We are seeing Allison Lopez, date of birth 1985, in the clinic at [facility] Yaquelin Bailey MD is the patient's PCP. Allison Lopez has an outstanding lab/procedure at the time we reviewed her chart. In order to help keep her health information updated, she has authorized us to request the following medical record(s):        (  )  MAMMOGRAM                                      (  )  COLONOSCOPY      ( X )  PAP SMEAR                                          (  )  OUTSIDE LAB RESULTS     (  )  DEXA SCAN                                          (  )  EYE EXAM            (  )  FOOT EXAM                                          (  )  ENTIRE RECORD     (  )  OUTSIDE IMMUNIZATIONS                 (  )  _______________         Please fax records to Yaquelin Bailey MD, [phone]     If you have any questions, please contact Kyung Shepherd LPN at [phone].           Patient Name: Allison Lopez  : 1985  Patient Phone #: [phone]

## 2024-10-03 ENCOUNTER — TELEPHONE (OUTPATIENT)
Dept: FAMILY MEDICINE | Facility: CLINIC | Age: 39
End: 2024-10-03
Payer: COMMERCIAL

## 2024-10-03 DIAGNOSIS — R82.71 BACTERIA IN URINE: ICD-10-CM

## 2024-10-03 DIAGNOSIS — R76.8 RHEUMATOID FACTOR POSITIVE: Primary | ICD-10-CM

## 2024-10-03 DIAGNOSIS — E78.2 MIXED HYPERLIPIDEMIA: ICD-10-CM

## 2024-10-03 DIAGNOSIS — E55.9 VITAMIN D DEFICIENCY: Primary | ICD-10-CM

## 2024-10-03 DIAGNOSIS — M05.9 SEROPOSITIVE RHEUMATOID ARTHRITIS: ICD-10-CM

## 2024-10-03 RX ORDER — OMEGA-3 FATTY ACIDS 1000 MG
1 CAPSULE ORAL 2 TIMES DAILY
COMMUNITY
Start: 2024-10-03 | End: 2025-10-03

## 2024-10-03 RX ORDER — ERGOCALCIFEROL 1.25 MG/1
50000 CAPSULE ORAL
Qty: 12 CAPSULE | Refills: 0 | Status: SHIPPED | OUTPATIENT
Start: 2024-10-03 | End: 2025-01-01

## 2024-10-03 NOTE — TELEPHONE ENCOUNTER
----- Message from Yaquelin Bailey MD sent at 10/3/2024  8:10 AM CDT -----  - Vitamin D is low, 17, normal: 30-80, I sent a prescription for once weekly Vitamin D2 to take for a 3 month course, get 15 minutes of sunlight daily wearing sunscreen, recheck Vitamin D level in 01/2025.    The ASCVD Risk score (Desmond DK, et al., 2019) failed to calculate for the following reasons:  - Urinalysis shows bacteria in urine, order to add urine culture to lab is in file for further recommendations, my office staff will contact the lab to add.   - Her cholesterol is mildly elevated, she doesn't need a statin cholesterol medication, needs to follow low cholesterol eating plan, exercise, and take on over the counter omega-3/fatty acid supplement as directed, recheck fasting lipid panel with annual wellness labs.   - Remaining labs are stable from previous and essentially normal.

## 2024-10-03 NOTE — TELEPHONE ENCOUNTER
Patient notified of results and recommendation, verbalized understanding.  Lab notified of add on.

## 2024-10-04 ENCOUNTER — PATIENT MESSAGE (OUTPATIENT)
Dept: FAMILY MEDICINE | Facility: CLINIC | Age: 39
End: 2024-10-04
Payer: COMMERCIAL

## 2024-10-05 LAB — BACTERIA UR CULT: NORMAL

## 2024-10-07 ENCOUNTER — PATIENT MESSAGE (OUTPATIENT)
Dept: FAMILY MEDICINE | Facility: CLINIC | Age: 39
End: 2024-10-07
Payer: COMMERCIAL

## 2024-10-22 ENCOUNTER — DOCUMENTATION ONLY (OUTPATIENT)
Dept: RHEUMATOLOGY | Facility: CLINIC | Age: 39
End: 2024-10-22
Payer: COMMERCIAL

## 2024-11-15 ENCOUNTER — E-VISIT (OUTPATIENT)
Dept: FAMILY MEDICINE | Facility: CLINIC | Age: 39
End: 2024-11-15
Payer: COMMERCIAL

## 2024-11-15 DIAGNOSIS — M54.16 LEFT LUMBAR RADICULOPATHY: Primary | ICD-10-CM

## 2024-11-15 RX ORDER — GABAPENTIN 100 MG/1
100 CAPSULE ORAL 3 TIMES DAILY PRN
Qty: 90 CAPSULE | Refills: 2 | Status: SHIPPED | OUTPATIENT
Start: 2024-11-15 | End: 2025-02-13

## 2024-11-15 NOTE — PROGRESS NOTES
Patient ID: Allison Lopez is a 39 y.o. female.    Chief Complaint: General Illness (Entered automatically based on patient selection in dakick.)    The patient initiated a request through [facility]akick on 11/15/2024 for evaluation and management with a chief complaint of General Illness (Entered automatically based on patient selection in dakick.)     I evaluated the questionnaire submission on 11/15/2024.    [facility] Peq Evisit [facility]group-Muscle,Back,Joint    11/15/2024  7:17 AM CST - Filed by Patient   What do you need help with? Other Concern   Do you agree to participate in an E-Visit? Yes   If you have any of the following symptoms, please present to your local emergency room or call 911:  I acknowledge   Select all that apply: None of the above   What is the main issue you would like addressed today? Left leg going numb while walking and standing multiple days   Please describe your symptoms Starts at the foot when walking goes numb just left leg   Where is your problem located? Left leg   How severe are your symptoms? Mild   Have you had these symptoms before? Yes   How long have you been having these symptoms? For a few days   Please list any medications or treatments you have used for your condition and indicate if it was effective or not. N/A   What makes this feel better? Jadiel ignoring it and tapping or hitting it i guess   What makes this feel worse? Walking standing   Are these symptoms related to a condition that you currently have? I am not sure   What is the condition? Rheumatoid arthritis   When were you last seen for this condition?    Please describe any probable cause for these symptoms Walking, standing, sitting the legs just stars to go numb   Provide any additional information you feel is important.    Please attach any relevant images or files    Are you able to take your vital signs? No         Encounter Diagnosis   Name Primary?    Left lumbar radiculopathy Yes        Orders Placed This Encounter    Procedures    X-Ray Lumbar Complete Including Flex And Ext     Standing Status:   Future     Standing Expiration Date:   11/15/2025     Order Specific Question:   May the Radiologist modify the order per protocol to meet the clinical needs of the patient?     Answer:   Yes    X-Ray Hip 2 or 3 views Left with Pelvis when performed     Standing Status:   Future     Standing Expiration Date:   11/15/2025     Order Specific Question:   May the Radiologist modify the order per protocol to meet the clinical needs of the patient?     Answer:   Yes     Order Specific Question:   Release to patient     Answer:   Immediate    Ambulatory referral/consult to Neurology     Standing Status:   Future     Standing Expiration Date:   12/15/2025     Referral Priority:   Routine     Referral Type:   Consultation     Referral Reason:   Specialty Services Required     Referred to Provider:   Alanna Paul MD     Requested Specialty:   Neurology     Number of Visits Requested:   1    Ambulatory referral/consult to Physical/Occupational Therapy     Standing Status:   Future     Standing Expiration Date:   12/15/2025     Referral Priority:   Routine     Referral Type:   Physical Medicine     Referral Reason:   Specialty Services Required     Number of Visits Requested:   1      Medications Ordered This Encounter   Medications    gabapentin (NEURONTIN) 100 MG capsule     Sig: Take 1 capsule (100 mg total) by mouth 3 (three) times daily as needed (nerve pain).     Dispense:  90 capsule     Refill:  2      This could be due to her back/hip or a nerve problem. I ordered testing for an Xray of her back and left hip, she needs to have done at any [facility] of her choice, except for Urgent Care Centers. I placed a referral to a neurologist for a evaluation for a nerve conduction study of her leg and a referral for a Physical therapist. I sent a prescription for a medication (Gabapentin) to help her nerve pain symptoms. Will treat  pending results. Notify M.D. or ER if symptoms persist or worsen, adverse medication side effects, temp >100.4, or any acute illness.       No follow-ups on file.      E-Visit Time Tracking: